# Patient Record
Sex: MALE | Race: WHITE
[De-identification: names, ages, dates, MRNs, and addresses within clinical notes are randomized per-mention and may not be internally consistent; named-entity substitution may affect disease eponyms.]

---

## 2020-11-14 ENCOUNTER — HOSPITAL ENCOUNTER (INPATIENT)
Dept: HOSPITAL 97 - ER | Age: 80
LOS: 2 days | Discharge: HOME | DRG: 287 | End: 2020-11-16
Attending: HOSPITALIST | Admitting: FAMILY MEDICINE
Payer: COMMERCIAL

## 2020-11-14 VITALS — BODY MASS INDEX: 28.5 KG/M2

## 2020-11-14 DIAGNOSIS — E03.9: ICD-10-CM

## 2020-11-14 DIAGNOSIS — I25.2: ICD-10-CM

## 2020-11-14 DIAGNOSIS — Z79.82: ICD-10-CM

## 2020-11-14 DIAGNOSIS — Z79.02: ICD-10-CM

## 2020-11-14 DIAGNOSIS — Z79.890: ICD-10-CM

## 2020-11-14 DIAGNOSIS — I10: ICD-10-CM

## 2020-11-14 DIAGNOSIS — E78.5: ICD-10-CM

## 2020-11-14 DIAGNOSIS — Z95.5: ICD-10-CM

## 2020-11-14 DIAGNOSIS — Z88.5: ICD-10-CM

## 2020-11-14 DIAGNOSIS — Z88.8: ICD-10-CM

## 2020-11-14 DIAGNOSIS — F03.90: ICD-10-CM

## 2020-11-14 DIAGNOSIS — I25.110: Primary | ICD-10-CM

## 2020-11-14 DIAGNOSIS — Z20.828: ICD-10-CM

## 2020-11-14 DIAGNOSIS — F41.8: ICD-10-CM

## 2020-11-14 DIAGNOSIS — Z79.899: ICD-10-CM

## 2020-11-14 LAB
BUN BLD-MCNC: 16 MG/DL (ref 7–18)
CKMB CREATINE KINASE MB: 1.9 NG/ML (ref 0.3–3.6)
GLUCOSE SERPLBLD-MCNC: 110 MG/DL (ref 74–106)
HCT VFR BLD CALC: 41.3 % (ref 39.6–49)
INR BLD: 0.97
LYMPHOCYTES # SPEC AUTO: 1.6 K/UL (ref 0.7–4.9)
NT-PROBNP SERPL-MCNC: 183 PG/ML (ref ?–450)
PMV BLD: 9.1 FL (ref 7.6–11.3)
POTASSIUM SERPL-SCNC: 4.4 MMOL/L (ref 3.5–5.1)
RBC # BLD: 4.32 M/UL (ref 4.33–5.43)
TROPONIN (EMERG DEPT USE ONLY): < 0.02 NG/ML (ref 0–0.04)
TROPONIN I: < 0.02 NG/ML (ref 0–0.04)

## 2020-11-14 PROCEDURE — 82553 CREATINE MB FRACTION: CPT

## 2020-11-14 PROCEDURE — 99285 EMERGENCY DEPT VISIT HI MDM: CPT

## 2020-11-14 PROCEDURE — 36415 COLL VENOUS BLD VENIPUNCTURE: CPT

## 2020-11-14 PROCEDURE — 85610 PROTHROMBIN TIME: CPT

## 2020-11-14 PROCEDURE — 85025 COMPLETE CBC W/AUTO DIFF WBC: CPT

## 2020-11-14 PROCEDURE — 84484 ASSAY OF TROPONIN QUANT: CPT

## 2020-11-14 PROCEDURE — 93005 ELECTROCARDIOGRAM TRACING: CPT

## 2020-11-14 PROCEDURE — 93454 CORONARY ARTERY ANGIO S&I: CPT

## 2020-11-14 PROCEDURE — 82550 ASSAY OF CK (CPK): CPT

## 2020-11-14 PROCEDURE — 83880 ASSAY OF NATRIURETIC PEPTIDE: CPT

## 2020-11-14 PROCEDURE — 80061 LIPID PANEL: CPT

## 2020-11-14 PROCEDURE — 71045 X-RAY EXAM CHEST 1 VIEW: CPT

## 2020-11-14 PROCEDURE — 83735 ASSAY OF MAGNESIUM: CPT

## 2020-11-14 PROCEDURE — 84443 ASSAY THYROID STIM HORMONE: CPT

## 2020-11-14 PROCEDURE — 80048 BASIC METABOLIC PNL TOTAL CA: CPT

## 2020-11-14 PROCEDURE — 84439 ASSAY OF FREE THYROXINE: CPT

## 2020-11-14 RX ADMIN — Medication SCH ML: at 21:37

## 2020-11-14 RX ADMIN — ATORVASTATIN CALCIUM SCH MG: 40 TABLET, FILM COATED ORAL at 21:36

## 2020-11-14 RX ADMIN — DONEPEZIL HYDROCHLORIDE SCH MG: 5 TABLET ORAL at 21:37

## 2020-11-14 NOTE — RAD REPORT
EXAM DESCRIPTION:  Shahriar Single View11/14/2020 12:52 pm

 

CLINICAL HISTORY:  Chest pain

 

COMPARISON:  2016

 

FINDINGS:   The lungs appear clear of acute infiltrate. The heart is normal size

 

IMPRESSION:   No acute abnormalities displayed

## 2020-11-14 NOTE — XMS REPORT
Continuity of Care Document

                          Created on:2020



Patient:CHIQUIS DODD

Sex:Male

:1940

External Reference #:[id number]





Demographics







                          Address                   [address] [place] [address]

 

                          Home Phone                [phone]

 

                          Preferred Language        en-US

 

                          Marital Status            Unknown

 

                          Shinto Affiliation     Unknown

 

                          Race                      Unknown

 

                          Ethnic Group              Unknown









Author







                          Organization              [facility]

 [facility]









Care Team Providers







                    Name                Role                Phone

 

                    [facility] Unavailable         Un

available









Problems







          Problem   Status    Onset     Classification Date      Comments  Sourc

e



                              Date                Reported            



                                                                      



                                                                      



                                                                      

 

          Essential tremor Active    09/15/20  Problem   2020           [facility]

lorena



          (disorder)           15                                      Neuro



                                                                      



                                                                      

 

          Dystonic tremor Active              Problem   2020           [facility]

tracey



          (finding)                                                   Neuro



                                                                      



                                                                      

 

          Hypertensive Active              Problem   2020           [facility]

r



          disorder, systemic                                                   N

euro



          arterial                                                    



          (disorder)                                                   



                                                                      

 

          Hyperlipidemia Active              Problem   2020           [facility]

her



          (disorder)                                                   Neuro



                                                                      



                                                                      

 

          Hypothyroidism Active              Problem   2020           [facility]

her



          (disorder)                                                   Neuro



                                                                      



                                                                      

 

          Impaired cognition Active              Problem   2020           

[facility]



          (finding)                                                   Neuro



                                                                      



                                                                      







Medications







        Medication Details Route   Status  Patient Ordering Order   Source



                                        Instructions Provider Date    



                                                                



                                                                



                                                                

 

        Donepezil 5 mg = 1         Active                   [facility]



        hydrochloride 5 tab, PO,                                 020     [facility]



        MG Oral Tablet Bedtime, #                                         



        [Aricept] 30 tab, 3                                         



                Refill(s)                                         



                                                                



                                                                

 

        primidone 50 mg 25 mg =         Active                   [facility]



        oral tablet 0.5 tab,                                 019     [facility]



                PO, BID, #                                         



                90 tab, 0                                         



                Refill(s),                                         



                Pharmacy:                                         



                [id number]                                         



                                                                



                                                                







Allergies, Adverse Reactions, Alerts







        Substance Category Reaction Severity Reaction Status  Date    Comments S

ource



                                        type            Reported         



                                                                        



                                                                        



                                                                        

 

        iodine  Assertion         Moderate Drug    Active                  [facility]

er



                                        allergy                         [facility]



                                                                        



                                                                        







Immunizations







                                        No Data Provided for This Section







Results







                                        No Data Provided for This Section







Pathology Reports







                                        No Data Provided for This Section







Diagnostic Reports







                                        No Data Provided for This Section







Consultation Notes







                                        No Data Provided for This Section







Discharge Summaries







                                        No Data Provided for This Section







History and Physicals







                                        No Data Provided for This Section







Vital Signs







             Vital Sign   Value        Date         Comments     Source



                                                                 

 

             Systolic (mm Hg) 138          2020                [facility]

ro



                                                                 



                                                                 

 

             Diastolic (mm Hg) 72           2020                [facility]

uro



                                                                 



                                                                 

 

             Heart Rate   52           2020                [facility]



                                                                 



                                                                 

 

             Respitory Rate 16           2020                [facility]



                                                                 



                                                                 

 

             Height       177.8 cm     2020                [facility]



                                                                 



                                                                 

 

             Weight       94.545       2020                [facility]



                                                                 



                                                                 

 

             BMI Calculated 29.91        2020                [facility]



                                                                 



                                                                 

 

             Systolic (mm Hg) 124          2020                [facility]

ro



                                                                 



                                                                 

 

             Diastolic (mm Hg) 69           2020                [facility]

uro



                                                                 



                                                                 

 

             Heart Rate   50           2020                [facility]



                                                                 



                                                                 

 

             Respitory Rate 16           2020                [facility]



                                                                 



                                                                 

 

             Temperature Oral (F) 97.1 F       2020                [facility]

 Neuro



                                                                 



                                                                 

 

             Height       182.88 cm    2020                [facility]



                                                                 



                                                                 

 

             Weight       94.545       2020                [facility]



                                                                 



                                                                 

 

             BMI Calculated 28.27        2020                [facility]



                                                                 



                                                                 

 

             Systolic (mm Hg) 116          2019                [facility]

ro



                                                                 



                                                                 

 

             Diastolic (mm Hg) 64           2019                [facility]

uro



                                                                 



                                                                 

 

             Heart Rate   55           2019                [facility]



                                                                 



                                                                 

 

             Respitory Rate 16           2019                [facility]



                                                                 



                                                                 

 

             Height       177.8 cm     2019                [facility]



                                                                 



                                                                 

 

             Weight       88.636       2019                [facility]



                                                                 



                                                                 

 

             BMI Calculated 28.04        2019                [facility]



                                                                 



                                                                 







Encounters







       Location Location Encounter Encounter Reason Attending ADM    [facility]     [facility]

us Source



              Details Type   Number For    Provider Date   Date          



                                   Visit                              



                                                                      



                                                                      



                                                                      

 

                     Outpatient [id number]        MARY GRACE           Active 

[facility]                  Rj



                                                                      



                                                                      



                                                                      



                                                                      

 

       [facility]           Outside [id number]                        [facility]



       Neurology        Medical                      /2019         Neuro



       [place]        Records                                           



                                                                      



                                                                      



                                                                      

 

                     Outpatient [id number]        Mary Grace           Active 

[facility]                  Rj



                                                                      



                                                                      



                                                                      



                                                                      

 

       [facility]           Outpatient [id number]        Mary Grace           

[facility]



       Neurology                             Krell  /2019         Neuro



       [place]                                                         



                                                                      



                                                                      



                                                                      

 

                     Outpatient [id number]        Mary Grace           Active 

[facility]                  [place]



                                                                      



                                                                      



                                                                      



                                                                      

 

                     Outpatient [id number]        Mary Grace           Active 

[facility]                  [place]



                                                                      



                                                                      



                                                                      



                                                                      

 

       [facility]           Ambulatory [id number]        [place]           

[facility]



       Neurology        Pre-Reg               Feaver /2020         Neuro



       [place]                                                         



                                                                      



                                                                      



                                                                      

 

       [facility]           Outpatient [id number]        [place]           

[facility]



       Neurology                             Feaver /2020         Neuro



       [place]                                                         



                                                                      



                                                                      



                                                                      

 

                     Outpatient [id number]        Mary Grace           Active 

[facility]                  [place]



                                                                      



                                                                      



                                                                      



                                                                      

 

       [facility]           Outpatient [id number]        Mary Grace           

[facility]



       Neurology                             Krell  /2020         Neuro



       [place]                                                         



                                                                      



                                                                      



                                                                      

 

                     Outpatient [id number]        Mary Grace           Active 

[facility]                  [place]



                                                                      



                                                                      



                                                                      



                                                                      







Procedures







                                        No Data Provided for This Section







Assessment and Plan







                                        No Data Provided for This Section







Plan of Care







                                        No Data Provided for This Section







Social History







                    Social History      Date                Source



                                                            

 

                    Social History TypeResponse 2020          [facility]

o



                    Smoking Status                          



                                        Former smoker; Type: Cigarettes; Exposur

e to Tobacco Smoke None; Cigarette 

Smoking Last 365 Days No; Reg Smoking Cessation Counseling No1                  

         



                    entered on: 20                     



                    1Tobacco free approximetley 40yrs ago                     







Family History







                                        No Data Provided for This Section







Advance Directives







                                        No Data Provided for This Section







Functional Status







                                        No Data Provided for This Section

## 2020-11-14 NOTE — EDPHYS
Physician Documentation                                                                           

 [facility]                                                                 

Name: Luan Hogan                                                                                 

Age: 80 yrs                                                                                       

Sex: Male                                                                                         

: 1940                                                                                   

MRN: [id number]                                                                                   

Arrival Date: 2020                                                                          

Time: 11:52                                                                                       

Account#: [id number]                                                                            

Bed 7                                                                                             

Private MD: Ankush Stephenson                                                                         

ED Physician Vikas De Souza                                                                         

HPI:                                                                                              

                                                                                             

14:03 This 80 yrs old  Male presents to ER via Ambulatory with complaints of Chest   rn  

      Pain, Chest Pressure.                                                                       

14:03 The patient or guardian reports chest pain that is located primarily in the substernal  rn  

      area. Onset: this morning. The pain does not radiate. Associated signs and symptoms:        

      Pertinent positives: None. Pertinent negatives: abdominal pain, cough, diaphoresis,         

      dizziness, lower extremity swelling, lightheadedness, nausea, near syncope,                 

      palpitations, recent travel, shortness of breath, syncope, vomiting. The chest pain is      

      described as a heaviness, a pressure. Duration: The patient or guardian reports             

      multiple episodes, that have now resolved. Modifying factors: The symptoms are              

      alleviated by nothing. the symptoms are aggravated by nothing. Severity of pain: At its     

      worst the pain was moderate in the emergency department the pain has resolved. It is        

      unknown whether or not the patient has had similar symptoms in the past. Reports chest      

      pain, 2 episodes, began early this morning, lasts approx 10-15 min, went away on its        

      own, then came back for another 10 min. NO fever/cough/sob/abd pain/hemoptysis. .           

                                                                                                  

Historical:                                                                                       

- Allergies:                                                                                      

12:03 Iodine;                                                                                 iw  

12:03 tramadol;                                                                               iw  

- Home Meds:                                                                                      

12:03 lisinopril 20 mg Oral tab 1 tab once daily [Active]; allopurinol 300 mg Oral tab 1 tab  iw  

      once daily [Active]; clopidogrel 75 mg oral tab 1 tab once daily [Active]; metoprolol       

      tartrate 25 mg Oral tab 1 tab once daily [Active]; escitalopram oxalate 10 mg oral tab      

      1 tab once daily [Active]; levothyroxine 112 mcg tab 1 tab once daily [Active];             

      primidone 50 mg Oral tab [Active]; atorvastatin 40 mg oral tab 1 tab once daily             

      [Active]; aspirin 81 mg Oral TbEC 1 tab once daily [Active]; donepezil 5 mg oral TbDL 1     

      tab once daily [Active];                                                                    

- PMHx:                                                                                           

12:03 Myocardial infarction;                                                                  iw  

- PSHx:                                                                                           

12:03 Heart stents; Hernia repair;                                                            iw  

                                                                                                  

- Immunization history:: Adult Immunizations up to date.                                          

- Social history:: Smoking status: Patient/guardian denies using tobacco, but has a               

  distant history of tobacco abuse.                                                               

- Family history:: not pertinent.                                                                 

- Hospitalizations: : No recent hospitalization is reported.                                      

                                                                                                  

                                                                                                  

ROS:                                                                                              

14:03 Constitutional: Negative for fever, chills, and weight loss, Eyes: Negative for injury, rn  

      pain, redness, and discharge, Neck: Negative for injury, pain, and swelling,                

      Cardiovascular: Negative for palpitations, and edema, Respiratory: Negative for             

      shortness of breath, cough, wheezing, and pleuritic chest pain, Abdomen/GI: Negative        

      for abdominal pain, nausea, vomiting, diarrhea, and constipation, Back: Negative for        

      injury and pain, : Negative for injury, bleeding, discharge, and swelling,                

      MS/Extremity: Negative for injury and deformity, Skin: Negative for injury, rash, and       

      discoloration, Neuro: Negative for headache, weakness, numbness, tingling, and seizure.     

                                                                                                  

Exam:                                                                                             

14:03 Constitutional:  This is a well developed, well nourished patient who is awake, alert,  rn  

      and in no acute distress. Head/Face:  Normocephalic, atraumatic. Cardiovascular:            

      Bradycardic, Regular rhythm.  No pulse deficits. Respiratory:  Speaking full sentences,     

      unlabored.  Abdomen/GI:  soft, non-tender Skin:  Warm, dry MS/ Extremity:  Pulses           

      equal, no cyanosis.  Neurovascular intact.  Full, normal range of motion.  Equal            

      circumference. Neuro:  Awake and alert, GCS 15                                              

                                                                                                  

Vital Signs:                                                                                      

11:58  / 91; Pulse 56; Resp 16; Temp 98.1; Pulse Ox 98% on R/A; Weight 92.99 kg; Height iw  

      5 ft. 11 in. (180.34 cm);                                                                   

12:38  / 77; Pulse 66; Resp 17 S; Pulse Ox 100% on R/A;                                 jd3 

13:38  / 74; Pulse 52; Resp 19 S; Pulse Ox 98% on R/A;                                  jd3 

14:15  / 77; Pulse 62; Resp 13; Pulse Ox 96% ;                                          bp  

16:14  / 70; Pulse 62; Resp 17; Pulse Ox 99% ;                                          rb3 

11:58 Body Mass Index 28.59 (92.99 kg, 180.34 cm)                                             iw  

                                                                                                  

MDM:                                                                                              

12:16 Patient medically screened.                                                             rn  

14:14 Differential diagnosis: abnormal EKG, acute myocardial infarction, coronary artery      rn  

      disease costochondritis, esophagitis, gastritis, gastroesophageal reflux disease            

      (GERD), pleurisy, pneumonia, pneumothorax, stable angina, unstable angina. The patient      

      was given aspirin in the Emergency Department. Data reviewed: vital signs, nurses           

      notes, lab test result(s), EKG, radiologic studies, plain films, and as a result, I         

      will admit patient. Counseling: I had a detailed discussion with the patient and/or         

      guardian regarding: the historical points, exam findings, and any diagnostic results        

      supporting the discharge/admit diagnosis, lab results, radiology results, the need for      

      further work-up and treatment in the hospital. Response to treatment: the patient's         

      symptoms have resolved after treatment, the patient's condition has returned to base        

      line, and as a result, I will admit patient. Admission orders: after a detailed             

      discussion of the patient's condition and case, the admit orders are written by me.         

      Special discussion:. ED course: Consulted with Dr. Hanna, will consult when admitted.     

      Admitted to Dr. Michel. .                                                                   

                                                                                                  

                                                                                             

12:27 Order name: Basic Metabolic Panel; Complete Time: 13:10                                 rn  

                                                                                             

12:27 Order name: CBC with Diff; Complete Time: 13:10                                         rn  

                                                                                             

12:27 Order name: NT PRO-BNP; Complete Time: 13:10                                            rn  

                                                                                             

12:27 Order name: PT-INR; Complete Time: 13:10                                                rn  

                                                                                             

12:27 Order name: Troponin (emerg Dept Use Only); Complete Time: 13:10                        rn  

                                                                                             

12:27 Order name: XRAY Chest (1 view); Complete Time: 13:30                                   rn  

                                                                                             

12:27 Order name: EKG; Complete Time: 12:27                                                   rn  

                                                                                             

12:27 Order name: Cardiac monitoring; Complete Time: 12:37                                    rn  

                                                                                             

12:27 Order name: EKG - Nurse/Tech; Complete Time: 12:38                                      rn  

                                                                                             

12:27 Order name: IV Saline Lock; Complete Time: 12:38                                        rn  

                                                                                             

12:27 Order name: Labs collected and sent; Complete Time: 12:38                               rn  

                                                                                             

12:27 Order name: O2 Per Protocol; Complete Time: 12:38                                       rn  

                                                                                             

12:27 Order name: O2 Sat Monitoring; Complete Time: 12:38                                     rn  

                                                                                                  

Administered Medications:                                                                         

13:55 Drug: Aspirin Chewable Tablet 324 mg Route: PO;                                         jd3 

                                                                                                  

                                                                                                  

Disposition:                                                                                      

20 14:17 Hospitalization ordered by Red Michel for Observation. Preliminary             

  diagnosis is Chest pain, unspecified.                                                           

- Bed requested for Telemetry/MedSurg (observation).                                              

- Status is Observation.                                                                      bp  

- Condition is Stable.                                                                            

- Problem is new.                                                                                 

- Symptoms have improved.                                                                         

                                                                                                  

                                                                                                  

                                                                                                  

Signatures:                                                                                       

Dispatcher [employer]                           EDMS                                                 

Mirna Irene RN RN iw Nieto, Roman, MD MD rn Davies, Jonathon, RN RN jd3 Aguilar, Jose, RN RN ja1 Peltier, Brian RN                      RN   bp                                                   

                                                                                                  

Corrections: (The following items were deleted from the chart)                                    

14:12 14:03 Constitutional: Negative for fever, chills, and weight loss, Eyes: Negative for   rn  

      injury, pain, redness, and discharge, Neck: Negative for injury, pain, and swelling,        

      Cardiovascular: Negative for palpitations, and edema, Respiratory: Negative for             

      shortness of breath, cough, wheezing, and pleuritic chest pain, Abdomen/GI: Negative        

      for abdominal pain, nausea, vomiting, diarrhea, and constipation, Back: Negative for        

      injury and pain, : Negative for injury, bleeding, discharge, and swelling,                

      MS/Extremity: Negative for injury and deformity, Skin: Negative for injury, rash, and       

      discoloration, Neuro: Negative for headache, weakness, numbness, tingling, and seizure,     

      rn                                                                                          

15:36 14:17 Hospitalization Ordered by Red Michel DO for Observation. Preliminary          ja1 

      diagnosis is Chest pain, unspecified. Bed requested for Telemetry/MedSurg                   

      (observation). Status is Observation. Condition is Stable. Problem is new. Symptoms         

      have improved. rn                                                                           

17:38 15:36 2020 14:17 Hospitalization Ordered by Red Michel DO for Observation.     bp  

      Preliminary diagnosis is Chest pain, unspecified. Bed requested for Telemetry/MedSurg       

      (observation). Status is Observation. Condition is Stable. Problem is new. Symptoms         

      have improved. ja1                                                                          

                                                                                                  

**************************************************************************************************

## 2020-11-14 NOTE — XMS REPORT
Summary of Care: 20 - 20

                          Created on:September 15, 2097



Patient:CHIQUIS DODD

Sex:Male

:1940

External Reference #:[id number]





Demographics







                          Address                   [address] [place] [address]

 

                          Home Phone                [phone]

 

                          Email Address             [email or web address]

 

                          Preferred Language        English

 

                          Marital Status            

 

                          Gnosticism Affiliation     Unknown

 

                          Race                      Other

 

                          Additional Race(s)        Unavailable

 

                          Ethnic Group              Not  or 









Author







                          Organization              [facility]

 

                          Address                   [address] [place] [address]-

 

                          Phone                     [phone]









Encounter

[facility](FIN) [id number] Date(s): 20 - 20

[facility] [address] [place] [address]-     [phone]

Discharge Disposition: Home or Self Care

Attending Physician: Luis Sharma MD

Referring Physician: Luis Sharma MD





Vital Signs







                          Most recent to oldest [Reference Range]: 1

 

                          Height                    177.8 cm



                                                    (20 1:32 PM)

 

                          Blood Pressure [/60-90 mmHg] 138/72 mmHg



                                                    (20 1:32 PM)

 

                          Respiratory Rate [14-20 BRMIN] 16 BRMIN



                                                    (20 1:32 PM)

 

                          Peripheral Pulse Rate [ bpm] 52 bpm



                                                    *LOW*



                                                    (20 1:32 PM)

 

                          Weight                    94.545 kg



                                                    (20 1:32 PM)

 

                          Body Mass Index           29.91 m2



                                                    (20 1:32 PM)







Problem List







             Condition    Effective Dates Status       Health Status Informant

 

             Dystonic tremor(Confirmed)              Active                    

 

             Essential tremor(Confirmed) 9/15/15      Active                    

 

             HTN - Hypertension(Confirmed)              Active                  

  

 

             Hyperlipidemia(Confirmed)              Active                    

 

             Hypothyroidism(Confirmed)              Active                    

 

             MCI (mild cognitive              Active                    



             impairment)(Confirmed)                                        







Allergies, Adverse Reactions, Alerts







                Substance       Reaction        Severity        Status

 

                iodine                          Moderate        Active







Medications

Aricept 5 mg oral tablet

5 mg = 1 tab, PO, Bedtime, # 30 tab, 3 Refill(s)

Start Date: 20

Stop Date: 3/5/21

Status: Ordered



Results

No data available for this section



Immunizations

No data available for this section



Procedures

No data available for this section



Social History







                          Social History Type       Response

 

                          Smoking Status            Former smoker; Type: Cigaret

pablo; Exposure to Tobacco Smoke None; 

Cigarette Smoking Last 365 Days No; Reg Smoking Cessation Counseling No1



                                                    entered on: 20



1Tobacco free approximetley 40yrs ago



Assessment and Plan

No data available for this section

## 2020-11-14 NOTE — P.HP
Certification for Inpatient


Patient admitted to: Observation


With expected LOS: <2 Midnights


Patient will require the following post-hospital care: None


Practitioner: I am a practitioner with admitting privileges, knowledge of 

patient current condition, hospital course, and medical plan of care.


Services: Services provided to patient in accordance with Admission requirements

found in Title 42 Section 412.3 of the Code of Federal Regulations





Patient History


Date of Service: 11/14/20


Primary Care Provider: Dr. Stephenson; Cardiology-Dr. Hanna


Reason for admission: Chest pain, shortness of breath


History of Present Illness: 





80-year-old  male with history of CAD, hypertension, hypothyroidism, 

hyperlipidemia and dementia.





Patient presented to the emergency room with chest pain. Chest pain occurred 

twice this morning.  Each lasted several min.  It was mainly to the substernal 

region.  He describes the chest pain more of a pressure-like sensation.  No 

radiation pain noted. He had some shortness of breath.  He came to the ER for 

further evaluation.





In the ER patient evaluated.  No significant ST changes noted. CBC unremarkable.

 Sodium 142, potassium 4.4.  BUN is 16,  GFR 64.  Glucose 110.  Troponin 

unremarkable.  Due to his multiple risk factors the patient was admitted for 

further evaluation and treatment.





When I saw the patient ER, he appeared stable.  Patient denies any tobacco 

history.  Drinks on occasion.


Allergies





iodine Allergy (Unknown, Verified 07/18/16 10:29)


   swelling to throat


tramadol Allergy (Unverified 03/02/18 15:00)


   Unknown





Home medications list reviewed: Yes


Home Medications: 








Amlodipine [Norvasc] 5 mg PO DAILY 01/10/16 


Aspirin Chewable [Aspirin Chewable*] 81 mg PO DAILY 01/10/16 


Atorvastatin Calcium [Lipitor] 40 mg PO DAILY 01/10/16 


Clopidogrel Bisulfate [Plavix] 75 mg PO DAILY 01/10/16 


Folic Acid 1 mg PO DAILY 01/10/16 


Latanoprost Ophth [Xalatan 0.005% Ophth Sol] 25 drops OPTH DAILY 01/10/16 


Levothyroxine [Synthroid] 112 mcg PO [id number] 01/10/16 


Metoprolol Tartrate [Lopressor] 25 mg PO DAILY 01/10/16 


Omega-3 Fatty Acids [Fish Oil] 500 mg PO DAILY 01/10/16 


Primidone [Mysoline] 50 mg PO BID 01/10/16 


Timolol Maleate [Istalol] 1 drop EACH EYE DAILY 01/10/16 


Ubidecarenone [Co Q-10] 100 mg PO DAILY 01/10/16 


allopurinoL [Zyloprim] 300 mg PO DAILY 01/10/16 


lisinopriL [Prinivil] 20 mg PO DAILY 01/10/16 








- Past Medical/Surgical History


Diabetic: No


-: CAD with prior stents


-: HTN


-: Hyperlipidemia


-: Hypothyroidism


-: Dementia


-: Stents


-: Hernia Repair


Psychosocial/ Personal History: Patient is 





- Family History


Family History: Reviewed- Non-Contributory





- Social History


Smoking Status: Never smoker


Alcohol use: Yes


CD- Drugs: No


Caffeine use: Yes


Place of Residence: Home





Review of Systems


General: As per HPI


Eyes: Unremarkable


ENT: Unremarkable


Respiratory: Shortness of Breath, As per HPI


Cardiovascular: Chest Pain, As per HPI


Gastrointestinal: Unremarkable


Genitourinary: Unremarkable


Musculoskeletal: Unremarkable


Integumentary: Unremarkable


Neurological: Unremarkable


Lymphatics: Unremarkable





Physical Examination





- Physical Exam


General: Alert, In no apparent distress, Oriented x3, Cooperative


HEENT: Atraumatic, Normocephalic, Mucous membr. moist/pink


Neck: Supple


Respiratory: Clear to auscultation bilaterally, Normal air movement


Cardiovascular: Normal pulses, Regular rate/rhythm


Gastrointestinal: Normal bowel sounds, Soft and benign, Non-distended, No 

tenderness, No masses, No rebound, No guarding


Musculoskeletal: No erythema, No tenderness, No warmth


Integumentary: No tenderness/swelling, No erythema, No warmth, No cyanosis


Neurological: Normal speech, Normal strength at 5/5 x4 extr, Normal tone, Normal

 affect





- Studies


Laboratory Data (last 24 hrs)





11/14/20 12:34: PT 11.4, INR 0.97


11/14/20 12:34: WBC 5.0, Hgb 13.6, Hct 41.3, Plt Count 188


11/14/20 12:34: Sodium 142, Potassium 4.4, BUN 16, Creatinine 1.10, Glucose 110 

H








Assessment and Plan





- Plan





Impression:


Chest pain with shortness of breath with history of CAD and prior stents


Hypertension


Hypothyroidism


Hyperlipidemia


Dementia


Depression with anxiety





Plan:


Chest pain with shortness of breath with history of CAD and prior stents:  

Patient will be admitted for further evaluation and treatment.  Continue 

telemetry and monitor cardiac enzymes.  Cardiology consulted to further 

evaluate.  Patient has seen Cardiology as an outpatient.  Continue aspirin, 

Plavix, statin and blood pressure medication.  Will provide Lovenox for DVT 

prophylaxis.  Await further recommendations from cardiology.  Anticipate 

improvement and possible discharge tomorrow if workup unremarkable.


Hypertension:  Continue lisinopril.  Will monitor and adjust appropriately.


Hypothyroidism:  Restart home medication.  Will check tsh and free T4.


Hyperlipidemia: Restart home medication.  Check fasting lipid panel.


Dementia: Restart home medication.


Depression with anxiety:  Restart home medication.


Discharge Plan: Home


Plan to discharge in: 24 Hours





- Advance Directives


Does patient have a Living Will: Yes


Does patient have a Durable POA for Healthcare: Yes





- Code Status/Comfort Care


Code Status Assessed: Yes (Patient full code)


Time Spent Managing Pts Care (In Minutes): 55

## 2020-11-14 NOTE — XMS REPORT
Summary of Care: 20 - 20

                            Created on:2089



Patient:CHIQUIS DODD

Sex:Male

:1940

External Reference #:[id number]





Demographics







                          Address                   [address] [id number]

 

                          Home Phone                [phone]

 

                          Email Address             [email or web address]

 

                          Preferred Language        English

 

                          Marital Status            

 

                          Alevism Affiliation     Unknown

 

                          Race                      Other

 

                          Additional Race(s)        Unavailable

 

                          Ethnic Group              Not  or 









Author







                          Organization              [facility] [place]

 

                          Address                   [address] [place] [address]-

 

                          Phone                     [phone]









Encounter

[facility](FIN) [id number] Date(s): 20 - 20

[facility] [address] [place] [address]-     [phone]

Discharge Disposition: Home or Self Care

Attending Physician: Luis Sharma MD

Referring Physician: Ankush Stephenson MD





Vital Signs







                          Most recent to oldest [Reference Range]: 1

 

                          Height                    182.88 cm



                                                    (20 10:25 AM)

 

                          Temperature Oral [96.4-99.1 DegF] 97.1 DegF



                                                    (20 10:25 AM)

 

                          Blood Pressure [/60-90 mmHg] 124/69 mmHg



                                                    (20 10:25 AM)

 

                          Respiratory Rate [14-20 BRMIN] 16 BRMIN



                                                    (20 10:25 AM)

 

                          Peripheral Pulse Rate [ bpm] 50 bpm



                                                    *LOW*



                                                    (20 10:25 AM)

 

                          Weight                    94.545 kg



                                                    (20 10:25 AM)

 

                          Body Mass Index           28.27 m2



                                                    (20 10:25 AM)







Problem List







             Condition    Effective Dates Status       Health Status Informant

 

             Dystonic tremor(Confirmed)              Active                    

 

             Essential tremor(Confirmed) 9/15/15      Active                    

 

             HTN - Hypertension(Confirmed)              Active                  

  

 

             Hyperlipidemia(Confirmed)              Active                    

 

             Hypothyroidism(Confirmed)              Active                    

 

             MCI (mild cognitive              Active                    



             impairment)(Confirmed)                                        







Allergies, Adverse Reactions, Alerts







                Substance       Reaction        Severity        Status

 

                iodine                          Moderate        Active







Medications

No Known Medications



Results

No data available for this section



Immunizations

No data available for this section



Procedures

No data available for this section



Social History







                          Social History Type       Response

 

                          Smoking Status            Former smoker; Type: Cigaret

pablo; Exposure to Tobacco Smoke None; 

Cigarette Smoking Last 365 Days No; Reg Smoking Cessation Counseling No1



                                                    entered on: 20



1Tobacco free approximetley 40yrs ago



Assessment and Plan

No data available for this section

## 2020-11-14 NOTE — ER
Nurse's Notes                                                                                     

 [facility]                                                                 

Name: Luan Hogan                                                                                 

Age: 80 yrs                                                                                       

Sex: Male                                                                                         

: 1940                                                                                   

MRN: [id number]                                                                                   

Arrival Date: 2020                                                                          

Time: 11:52                                                                                       

Account#: [id number]                                                                            

Bed 7                                                                                             

Private MD: Ankush Stephenson                                                                         

Diagnosis: Chest pain, unspecified                                                                

                                                                                                  

Presentation:                                                                                     

                                                                                             

11:58 Chief complaint: Patient states: chest pressure started at 0630 this morning ,          iw  

      intermittent, lasts approx 10 minutes. Coronavirus screen: At this time, the client         

      does not indicate any symptoms associated with coronavirus-19. Ebola Screen: Patient        

      negative for fever greater than or equal to 101.5 degrees Fahrenheit, and additional        

      compatible Ebola Virus Disease symptoms Patient denies exposure to infectious person.       

      Patient denies travel to an Ebola-affected area in the 21 days before illness onset. No     

      symptoms or risks identified at this time. Initial Sepsis Screen: Does the patient meet     

      any 2 criteria? No. Patient's initial sepsis screen is negative. Does the patient have      

      a suspected source of infection? No. Patient's initial sepsis screen is negative. Risk      

      Assessment: Do you want to hurt yourself or someone else? Patient reports no desire to      

      harm self or others. Onset of symptoms was 2020.                               

11:58 Method Of Arrival: Ambulatory                                                           iw  

11:58 Acuity: JUSTO 3                                                                           iw  

                                                                                                  

Historical:                                                                                       

- Allergies:                                                                                      

12:03 Iodine;                                                                                 iw  

12:03 tramadol;                                                                               iw  

- Home Meds:                                                                                      

12:03 lisinopril 20 mg Oral tab 1 tab once daily [Active]; allopurinol 300 mg Oral tab 1 tab  iw  

      once daily [Active]; clopidogrel 75 mg oral tab 1 tab once daily [Active]; metoprolol       

      tartrate 25 mg Oral tab 1 tab once daily [Active]; escitalopram oxalate 10 mg oral tab      

      1 tab once daily [Active]; levothyroxine 112 mcg tab 1 tab once daily [Active];             

      primidone 50 mg Oral tab [Active]; atorvastatin 40 mg oral tab 1 tab once daily             

      [Active]; aspirin 81 mg Oral TbEC 1 tab once daily [Active]; donepezil 5 mg oral TbDL 1     

      tab once daily [Active];                                                                    

- PMHx:                                                                                           

12:03 Myocardial infarction;                                                                  iw  

- PSHx:                                                                                           

12:03 Heart stents; Hernia repair;                                                            iw  

                                                                                                  

- Immunization history:: Adult Immunizations up to date.                                          

- Social history:: Smoking status: Patient/guardian denies using tobacco, but has a               

  distant history of tobacco abuse.                                                               

- Family history:: not pertinent.                                                                 

- Hospitalizations: : No recent hospitalization is reported.                                      

                                                                                                  

                                                                                                  

Screenin:15 Abuse screen: Denies threats or abuse. Nutritional screening: No deficits noted.        jd3 

      Tuberculosis screening: No symptoms or risk factors identified. Fall Risk Ambulatory        

      Aid- None/Bed Rest/Nurse Assist (0 pts). Gait- Normal/Bed Rest/Wheelchair (0 pts)           

      Mental Status- Oriented to own ability (0 pts). Total Haji Fall Scale indicates No         

      Risk (0-24 pts).                                                                            

                                                                                                  

Assessment:                                                                                       

12:14 General: Appears in no apparent distress. uncomfortable, Behavior is calm, cooperative, jd3 

      appropriate for age. Pain: Complains of pain in chest Pain does not radiate. Quality of     

      pain is described as pressure, Pain began suddenly. Neuro: Level of Consciousness is        

      awake, alert, obeys commands, Oriented to person, place, time, situation.                   

      Cardiovascular: Reports chest pain, Capillary refill < 3 seconds Patient's skin is warm     

      and dry. Rhythm is irregular. Respiratory: Airway is patent Respiratory effort is even,     

      unlabored, Respiratory pattern is regular, symmetrical, Denies cough, shortness of          

      breath. GI: No signs and/or symptoms were reported involving the gastrointestinal           

      system. : No signs and/or symptoms were reported regarding the genitourinary system.      

      EENT: No signs and/or symptoms were reported regarding the EENT system. Derm: Skin is       

      intact, Skin is dry, Skin is normal, Skin temperature is warm. Musculoskeletal:             

      Circulation, motion, and sensation intact. Range of motion: intact in all extremities.      

12:38 Reassessment: Patient appears in no apparent distress at this time. No changes from     jd3 

      previously documented assessment. Patient and/or family updated on plan of care and         

      expected duration. Pain level reassessed. Patient is alert, oriented x 3, equal             

      unlabored respirations, skin warm/dry/pink.                                                 

13:38 Reassessment: Patient appears in no apparent distress at this time. No changes from     jd3 

      previously documented assessment. Patient and/or family updated on plan of care and         

      expected duration. Pain level reassessed. Patient is alert, oriented x 3, equal             

      unlabored respirations, skin warm/dry/pink.                                                 

14:15 Reassessment: Patient appears in no apparent distress at this time. Patient and/or      bp  

      family updated on plan of care and expected duration. Pain level reassessed. Patient is     

      alert, oriented x 3, equal unlabored respirations, skin warm/dry/pink. RECD REPORT FROM     

      ÁNGEL CRANDALL. PT TBA FOR CHEST PAIN.                                                         

14:26 Reassessment: DR AGOSTO AT B/S FOR ADMIT.                                               bp  

16:16 Reassessment: Tried to call report, unable to give report at this time because vera Vieira 

      RN is discharging another pt.                                                               

                                                                                                  

Vital Signs:                                                                                      

11:58  / 91; Pulse 56; Resp 16; Temp 98.1; Pulse Ox 98% on R/A; Weight 92.99 kg; Height iw  

      5 ft. 11 in. (180.34 cm);                                                                   

12:38  / 77; Pulse 66; Resp 17 S; Pulse Ox 100% on R/A;                                 jd3 

13:38  / 74; Pulse 52; Resp 19 S; Pulse Ox 98% on R/A;                                  jd3 

14:15  / 77; Pulse 62; Resp 13; Pulse Ox 96% ;                                          bp  

16:14  / 70; Pulse 62; Resp 17; Pulse Ox 99% ;                                          rb3 

11:58 Body Mass Index 28.59 (92.99 kg, 180.34 cm)                                             iw  

                                                                                                  

ED Course:                                                                                        

11:52 Patient arrived in ED.                                                                  ag5 

11:52 Ankush Stephenson MD is Private Physician.                                                 ag5 

12:00 Triage completed.                                                                       iw  

12:04 Donte Bear, RN is Primary Nurse.                                                  jd3 

12:13 Arm band placed on.                                                                     jd3 

12:13 EKG done, by ED staff, reviewed by Vikas De Souza MD. Patient maintains SpO2 saturation    jd3 

      greater than 95% on room air.                                                               

12:16 Vikas De Souza MD is Attending Physician.                                                rn  

12:16 Patient has correct armband on for positive identification. Placed in gown. Bed in low  jd3 

      position. Call light in reach. Side rails up X 1. Adult w/ patient. Cardiac monitor on.     

      Pulse ox on. NIBP on.                                                                       

12:22 ED physician to see patient.                                                            jd3 

12:38 Inserted saline lock: 20 gauge in right antecubital area, using aseptic technique.      jd3 

      Blood collected.                                                                            

12:48 XRAY Chest (1 view) In Process Unspecified.                                             EDMS

13:38 ED physician to see patient.                                                            jd3 

13:55 Warm blanket given. Diet tray given. PO fluids given.                                   jd3 

14:17 Red Agosto DO is Hospitalizing Provider.                                           rn  

14:22 Report given to Ankush CRANDALL.                                                               jd3 

17:37 No provider procedures requiring assistance completed. Patient admitted, IV remains in  bp  

      place.                                                                                      

                                                                                                  

Administered Medications:                                                                         

13:55 Drug: Aspirin Chewable Tablet 324 mg Route: PO;                                         jd3 

                                                                                                  

                                                                                                  

Outcome:                                                                                          

14:17 Decision to Hospitalize by Provider.                                                    rn  

17:15 Admitted to Med/surg accompanied by tech, via wheelchair, with chart, Report called to  bp  

      YOUNG CRANDALL                                                                                    

17:15 Condition: stable                                                                       bp  

17:15 Instructed on the need for admit.                                                           

17:38 Patient left the ED.                                                                    bp  

                                                                                                  

Signatures:                                                                                       

Dispatcher [employer]                           EDMirna Ferrer, RN                     KARLEY   iw                                                   

Vikas De Souza MD MD rn Davies, Jonathon, RN RN   jAnkush Blackburn RN RN bp Gaskin, Ajare                                ag5                                                  

Kati Mclean, RN                     RN   rb3                                                  

                                                                                                  

Corrections: (The following items were deleted from the chart)                                    

12:38 12:14 Pain: Complains of pain in chest Pain does not radiate. Pain began suddenly, jd3  jd3 

14:23 14:00 Reassessment: Patient appears in no apparent distress at this time. Patient       bp  

      and/or family updated on plan of care and expected duration. Pain level reassessed.         

      Patient is alert, oriented x 3, equal unlabored respirations, skin warm/dry/pink. RECD      

      REPORT FROM ÁNGEL CRANDALL. PT TBA FOR CHEST PAIN bp                                           

14:23 14:00  / 77; Pulse 62bpm; Resp 13bpm; Pulse Ox 96%; bp                            bp  

                                                                                                  

**************************************************************************************************

## 2020-11-15 LAB
BUN BLD-MCNC: 17 MG/DL (ref 7–18)
CKMB CREATINE KINASE MB: 1.4 NG/ML (ref 0.3–3.6)
GLUCOSE SERPLBLD-MCNC: 95 MG/DL (ref 74–106)
HDLC SERPL-MCNC: 54 MG/DL (ref 40–60)
LDLC SERPL CALC-MCNC: 27 MG/DL (ref ?–130)
MAGNESIUM SERPL-MCNC: 2.2 MG/DL (ref 1.8–2.4)
POTASSIUM SERPL-SCNC: 4 MMOL/L (ref 3.5–5.1)
TROPONIN I: < 0.02 NG/ML (ref 0–0.04)
TSH SERPL DL<=0.05 MIU/L-ACNC: 2.63 UIU/ML (ref 0.36–3.74)

## 2020-11-15 RX ADMIN — PRIMIDONE SCH MG: 50 TABLET ORAL at 09:10

## 2020-11-15 RX ADMIN — Medication SCH MG: at 09:11

## 2020-11-15 RX ADMIN — CLOPIDOGREL BISULFATE SCH MG: 75 TABLET, FILM COATED ORAL at 09:10

## 2020-11-15 RX ADMIN — DONEPEZIL HYDROCHLORIDE SCH MG: 5 TABLET ORAL at 21:52

## 2020-11-15 RX ADMIN — ACETAMINOPHEN PRN MG: 500 TABLET, FILM COATED ORAL at 21:54

## 2020-11-15 RX ADMIN — Medication SCH ML: at 09:11

## 2020-11-15 RX ADMIN — Medication SCH ML: at 21:53

## 2020-11-15 RX ADMIN — ESCITALOPRAM OXALATE SCH MG: 20 TABLET, FILM COATED ORAL at 09:10

## 2020-11-15 RX ADMIN — CYANOCOBALAMIN TAB 1000 MCG SCH MCG: 1000 TAB at 09:11

## 2020-11-15 RX ADMIN — METOPROLOL TARTRATE SCH MG: 25 TABLET ORAL at 05:37

## 2020-11-15 RX ADMIN — ATORVASTATIN CALCIUM SCH MG: 40 TABLET, FILM COATED ORAL at 21:53

## 2020-11-15 RX ADMIN — LEVOTHYROXINE SODIUM SCH MG: 112 TABLET ORAL at 05:38

## 2020-11-15 RX ADMIN — ASPIRIN SCH MG: 81 TABLET, COATED ORAL at 09:10

## 2020-11-15 RX ADMIN — FOLIC ACID SCH MG: 1 TABLET ORAL at 09:10

## 2020-11-15 RX ADMIN — Medication SCH MG: at 09:09

## 2020-11-15 RX ADMIN — ACETAMINOPHEN PRN MG: 500 TABLET, FILM COATED ORAL at 15:53

## 2020-11-15 NOTE — P.PN
Subjective


Date of Service: 11/15/20


Primary Care Provider: Dr. Stephenson; Cardiology-Dr. Hanna


Chief Complaint: Chest pain, shortness of breath


Subjective: Other (Patient improved.  Chest pain also improved.  Patient with 

dementia.)





Physical Examination





- Vital Signs


Temperature: 97.5 F


Blood Pressure: 131/64


Pulse: 58


Respirations: 16


Pulse Ox (%): 98





- Physical Exam


General: Alert, In no apparent distress, Disheveled


HEENT: Atraumatic


Neck: Supple


Respiratory: Clear to auscultation bilaterally, Normal air movement


Cardiovascular: Normal pulses, Regular rate/rhythm


Gastrointestinal: Normal bowel sounds, Soft and benign, Non-distended, No 

tenderness, No masses, No rebound, No guarding


Musculoskeletal: No erythema, No tenderness, No warmth


Integumentary: No tenderness/swelling, No erythema, No warmth, No cyanosis


Neurological: Normal speech, Normal strength at 5/5 x4 extr, Normal tone, 

Dementia





- Studies


Laboratory Data (last 24 hrs)





11/14/20 12:34: PT 11.4, INR 0.97


11/14/20 12:34: WBC 5.0, Hgb 13.6, Hct 41.3, Plt Count 188


11/14/20 12:34: Sodium 142, Potassium 4.4, BUN 16, Creatinine 1.10, Glucose 110 

H





Medications List Reviewed: Yes





Assessment & Plan


Discharge Plan: Home


Plan to discharge in: 24 Hours


Physician Review Additional Text: 





Impression:


Chest pain with shortness of breath secondary to unstable angina complicated 

with history of CAD and prior stents


Hypertension


Hypothyroidism


Hyperlipidemia


Dementia


Depression with anxiety





Plan:


Chest pain with shortness of breath secondary to unstable angina complicated 

with history of CAD and prior stents:  Case discussed in detail with cardiology.

 Due to his dementia it is difficult to get an accurate history.  Cardiology 

believes chest pain related to unstable angina.  Previous records reviewed.  

Cardiology desires heart catheterization to further evaluate and assess 

especially with his significant CAD history and prior stents.  Continue to 

monitor closely.  Continue aspirin, Plavix, statin medication and blood pressure

medication control.  Will keep the patient NPO after midnight in preparation for

heart catheterization tomorrow.  Will discuss with family.  Continue DVT 

prophylaxis-Lovenox.  I will turn the service over to the hospitalist team 

tomorrow.  I will go over the plan of care with him. 


Hypertension:  Continue with medication.  Will monitor and adjust appropriately.


Hypothyroidism:  Continue medication.  Tsh within normal range.


Hyperlipidemia:  Continue medication.  LDL 27.


Dementia: Continue medication. 


Depression with anxiety:  Continue medication


Time Spent Managing Pts Care (In Minutes): 55

## 2020-11-15 NOTE — EKG
Test Date:    2020-11-14               Test Time:    12:09:42

Technician:   AGUSTIN                                    

                                                     

MEASUREMENT RESULTS:                                       

Intervals:                                           

Rate:         54                                     

NV:           198                                    

QRSD:         96                                     

QT:           422                                    

QTc:          400                                    

Axis:                                                

P:            35                                     

NV:           198                                    

QRS:          -18                                    

T:            21                                     

                                                     

INTERPRETIVE STATEMENTS:                                       

                                                     

Sinus bradycardia with premature atrial complexes

Cannot rule out Anterior infarct, age undetermined

Abnormal ECG

Compared to ECG 06/10/2016 16:47:01

Atrial premature complex(es) now present

Myocardial infarct finding now present



Electronically Signed On 11-15-20 07:40:39 CST by Braxton Hanna

## 2020-11-16 VITALS — OXYGEN SATURATION: 98 %

## 2020-11-16 VITALS — SYSTOLIC BLOOD PRESSURE: 126 MMHG | TEMPERATURE: 97.3 F | DIASTOLIC BLOOD PRESSURE: 58 MMHG

## 2020-11-16 PROCEDURE — B2111ZZ FLUOROSCOPY OF MULTIPLE CORONARY ARTERIES USING LOW OSMOLAR CONTRAST: ICD-10-PCS

## 2020-11-16 PROCEDURE — 4A023N7 MEASUREMENT OF CARDIAC SAMPLING AND PRESSURE, LEFT HEART, PERCUTANEOUS APPROACH: ICD-10-PCS

## 2020-11-16 RX ADMIN — CLOPIDOGREL BISULFATE SCH MG: 75 TABLET, FILM COATED ORAL at 05:42

## 2020-11-16 RX ADMIN — METOPROLOL TARTRATE SCH MG: 25 TABLET ORAL at 05:45

## 2020-11-16 RX ADMIN — ESCITALOPRAM OXALATE SCH MG: 20 TABLET, FILM COATED ORAL at 08:07

## 2020-11-16 RX ADMIN — ASPIRIN SCH: 81 TABLET, COATED ORAL at 08:06

## 2020-11-16 RX ADMIN — LEVOTHYROXINE SODIUM SCH MG: 112 TABLET ORAL at 05:42

## 2020-11-16 RX ADMIN — Medication SCH MG: at 08:07

## 2020-11-16 RX ADMIN — Medication SCH ML: at 08:08

## 2020-11-16 RX ADMIN — PRIMIDONE SCH MG: 50 TABLET ORAL at 08:07

## 2020-11-16 RX ADMIN — FOLIC ACID SCH MG: 1 TABLET ORAL at 08:07

## 2020-11-16 RX ADMIN — CYANOCOBALAMIN TAB 1000 MCG SCH MCG: 1000 TAB at 08:07

## 2020-11-16 NOTE — PN
Date of Progress Note:  11/16/2020



Mr. Hogan has came in with stable anginal symptoms.  He has a history of LAD stent in 2014.  He has 
hypertension, dyslipidemia, and hypothyroidism.  His blood pressure and cholesterol are well controll
ed.  He is on appropriate therapy with aspirin, Lipitor, Plavix, and metoprolol.  He is also on Loven
ox.  Prednisone 50 mg were ordered x2 the night before in the morning after catheterization, which wa
s planned for tomorrow, 11/16/2020.  The patient has not had any further chest pain since he was seen
.  Troponin remained negative.





NB/MODL

DD:  11/16/2020 05:57:01Voice ID:  [id number]

DT:  11/16/2020 09:15:29Report ID:  [id number]

## 2020-11-16 NOTE — P.DS
Discharge Date: 11/16/20


Primary Care Provider: Dr. Stephenson; Cardiology-Dr. Hanna


Disposition: ROUTINE DISCHARGE


Discharge Condition: GOOD


Reason for Admission: Chest pain, shortness of breath


Brief History of Present Illness: 








Patient is an 80-year-old  male with history of CAD, hypertension, 

hypothyroidism, hyperlipidemia and dementia.





Patient presented to the emergency room with chest pain. Chest pain occurred 

twice this morning.  Each lasted several min.  It was mainly to the substernal 

region.  He describes the chest pain more of a pressure-like sensation.  No 

radiation pain noted. He had some shortness of breath.  He came to the ER for 

further evaluation.





In the ER patient evaluated.  No significant ST changes noted. CBC unremarkable.

 Sodium 142, potassium 4.4.  BUN is 16,  GFR 64.  Glucose 110.  Troponin 

unremarkable.  Due to his multiple risk factors the patient was admitted for 

further evaluation and treatment.





When I saw the patient ER, he appeared stable.  Patient denies any tobacco 

history.  Drinks on occasion.


Hospital Course: 


She had a cardiac catheterization which revealed multiple vessel disease. There 

was no significant obstructive lesion and needed intervention immediately. 

Clinically, patient is doing better and is stable for discharge home with 

outpatient follow up. Patient is to return to the emergency room if symptoms 

worsen. Otherwise follow with cardiology in 2 weeks. Follow up with PCP in 1-2 

weeks. Continue with antiplatelet therapy and statin therapy. Continue with 

strict blood pressure control. 


Vital Signs/Physical Exam: 














Temp Pulse Resp BP Pulse Ox


 


 97.3 F   61   16   126/58 L  95 


 


 11/16/20 17:21  11/16/20 17:21  11/16/20 17:21  11/16/20 17:21  11/16/20 17:21








General: Alert, In no apparent distress, Oriented x3


Laboratory Data at Discharge: 














WBC  5.0 K/uL (4.3-10.9)   11/14/20  12:34    


 


Hgb  13.6 g/dL (13.6-17.9)   11/14/20  12:34    


 


Hct  41.3 % (39.6-49.0)   11/14/20  12:34    


 


Plt Count  188 K/uL (152-406)   11/14/20  12:34    


 


PT  11.4 SECONDS (9.5-12.5)   11/14/20  12:34    


 


INR  0.97   11/14/20  12:34    


 


Sodium  143 mmol/L (136-145)   11/15/20  05:19    


 


Potassium  4.0 mmol/L (3.5-5.1)   11/15/20  05:19    


 


BUN  17 mg/dL (7-18)   11/15/20  05:19    


 


Creatinine  1.07 mg/dL (0.55-1.3)   11/15/20  05:19    


 


Glucose  95 mg/dL ()   11/15/20  05:19    


 


Magnesium  2.2 mg/dL (1.8-2.4)   11/15/20  05:19    


 


Troponin I  < 0.02 ng/mL (0.0-0.045)   11/15/20  03:36    


 


Triglycerides  65 mg/dL (<150)   11/15/20  05:19    


 


Cholesterol  94 mg/dL (<200)   11/15/20  05:19    


 


HDL Cholesterol  54 mg/dL (40-60)   11/15/20  05:19    


 


Cholesterol/HDL Ratio  1.74   11/15/20  05:19    








Home Medications: 








Aspirin Chewable [Aspirin Chewable*] 81 mg PO DAILY 01/10/16 


Atorvastatin Calcium [Lipitor] 40 mg PO DAILY 01/10/16 


Clopidogrel Bisulfate [Plavix*] 75 mg PO DAILY 01/10/16 


Folic Acid 800 mcg PO DAILY 01/10/16 


Latanoprost Ophth [Xalatan 0.005%*] 1 drops OPTH DAILY 01/10/16 


Levothyroxine [Synthroid*] 112 mcg PO [id number] 01/10/16 


Metoprolol Tartrate [Lopressor*] 25 mg PO DAILY 01/10/16 


Omega-3 Fatty Acids [Fish Oil] 500 mg PO DAILY 01/10/16 


Primidone [Mysoline *] 50 mg PO BID 01/10/16 


Timolol Maleate [Istalol] 1 drop EACH EYE DAILY 01/10/16 


Ubidecarenone [Co Q-10] 400 mg PO DAILY 01/10/16 


allopurinoL [Zyloprim*] 300 mg PO DAILY 01/10/16 


lisinopriL [Prinivil*] 20 mg PO DAILY 01/10/16 


Ascorbic Acid [Vitamin C] 1,000 mg PO DAILY 11/14/20 


Cholecalciferol (Vitamin D3) [Vitamin D3] 5,000 iu PO DAILY 11/14/20 


Cyanocobalamin (Vitamin B-12) [Vitamin B-12] 1,000 mcg PO DAILY 11/14/20 


Donepezil [Aricept*] 5 mg PO DAILY 11/14/20 


Escitalopram [Lexapro*] 10 mg PO DAILY 11/14/20 


Vitamin E Acid Succinate [Vitamin E] 180 mg PO DAILY 11/14/20 


Nitroglycerin [Nitrostat] 0.4 mg SL Q5M #100 tab 11/19/20 


Nitroglycerin [Nitrostat] 0.4 mg SL Q5M #100 tab 11/19/20 





New Medications: 


Nitroglycerin [Nitrostat] 0.4 mg SL Q5M #100 tab


Nitroglycerin [Nitrostat] 0.4 mg SL Q5M #100 tab


Patient Discharge Instructions: OK TO DC IV AND DC HOME.  FOLLOW-UP WITH PRIMARY

 CARE PROVIDER IN 1-2 WEEKS.  FOLLOW-UP WITH CARDIOLOGY IN 1-2 WEEKS.  RETURN TO

 THE ER IF symptoms worsen.  CALL or TEXT DR. CID [phone] IF ANY 

QUESTIONS REGARDING HOSPITAL STAY.  PLEASE CALL THE FLOOR -[phone] IF ANY

 MEDICATION OR NURSING QUESTIONS.


Diet: AHA


Activity: Fall precautions


Followup: 


Braxton Hanna MD [ACTIVE - CAN ADMIT] - 


Ankush Stephenson MD [Primary Care Provider] - 


Time spent managing pt's care (in minutes): 35

## 2020-11-16 NOTE — CON
Date of Consultation:  11/14/2020



Reason For Consultation:  Chest pain.



History Of Present Illness:  Mr. Hogan is an 80-year-old male who had a history of stent in 2014 in 
the LAD.  In 2016, catheterization showed patent stent by Dr. Hurley.  Has not showed up for followup
 while in the office.  Has a history of hypertension, dyslipidemia, hypothyroidism.  He is allergic t
o iodine.  The patient came in with substernal chest pressure radiating to both arms with exertion wi
th some diaphoresis and shortness of breath.  No nausea or vomiting.  Denied PND, orthopnea, pedal ed
ema, palpitations, or syncope.  MI has been ruled out.



Allergies:  IODINE.



Review of Systems:

Negative.



Social History:  Negative.



Family History:  Negative.



Medications:  At home include aspirin, metoprolol, Norvasc, Synthroid, Lipitor, lisinopril, Plavix, z
yloprim.



Physical Examination:

Vital Signs:  Stable.  Afebrile. 

HEENT:  Negative. 

Neck:  Supple.  No bruit. 

Chest:  Clear. 

Cardiac:  Exam revealed aortic sclerosis, murmur.  No gallops or rubs. 

Abdomen:  Benign. 

Extremities:  Revealed no clubbing, cyanosis, or edema.  

Skin:  Dry and intact.  Pulses are present distally bilaterally.



Diagnostic Data:  All within normal limits.



Impression And Plan:  Patient with history of LAD stent x2 in 2014 with symptoms suggestive of unstab
le angina.  We will plan a left heart catheterization to define his coronary anatomy.  The patient un
derstands the risk and the benefits of the procedure and he agreed to proceed.  He will receive 2 dos
e with p.o. prednisone 1 at night and 1 in the morning of the catheterization.  We will hold his Love
nox before the catheterization.  Meanwhile, continue his present regimen, which does include aspirin,
 metoprolol and Lipitor as well as Plavix.  His other problems including hypertension and dyslipidemi
a are very well controlled.  We will see what the catheterization shows before making final decisions
.





NB/GERRI

DD:  11/16/2020 05:54:59Voice ID:  [id number]

DT:  11/16/2020 09:37:51Report ID:  [id number]

## 2020-11-17 NOTE — OP
Date of Procedure:  11/16/2020



Surgeon:  MYRTLE DIAZ



Procedure Performed:  Selective coronary angiogram.



Indication:  Unstable angina.



Access:  Right radial artery 6-[place] closed with TR band.



Complications:  None.



Bleeding:  Less than 5 mL.



Anesthesia:  Total sedation time was 15 minutes.



Description Of Procedure:  After risks, benefits, and alternatives were explained, the patient agreed
 to the procedure and signed informed consent.  The patient was brought into the cardiac catheterizat
ion laboratory and we prepped and draped in usual sterile fashion and used fentanyl, Versed, and also
 prepped with Solu-Medrol and Benadryl for iodine allergy and then achieved adequate moderate sedatio
n.  Then, we used a pediatric micropuncture kit to access right radial artery and placed a 6-[place] s
lender sheath into a 5-[place] Tiger catheter into the aortic root, engaged left main and right corona
ry artery and took standard views and removed the catheter and the wire and the sheath and placed TR 
band with good hemostasis.



Findings:  

1.Left main is large and normal.

2.LAD with a proximal 20% to 30% disease, nonobstructive and in the midportion has a stent that is p
atent and distally has mild, 10% to 20% disease.

3.Left circumflex is a relatively large vessel with a mid 30% stenosis.

4.RCA is dominant circulation with diffuse 10% to 20% stenosis.



Impression:  Mild nonobstructive coronary artery disease.



Recommendation:  Medical management.





SR/MODL

DD:  11/16/2020 13:43:33Voice ID:  [id number]

DT:  11/17/2020 01:07:57Report ID:  [id number]

## 2021-11-24 ENCOUNTER — HOSPITAL ENCOUNTER (EMERGENCY)
Dept: HOSPITAL 97 - ER | Age: 81
Discharge: HOME | End: 2021-11-24
Payer: COMMERCIAL

## 2021-11-24 VITALS — TEMPERATURE: 97.8 F

## 2021-11-24 VITALS — SYSTOLIC BLOOD PRESSURE: 136 MMHG | OXYGEN SATURATION: 100 % | DIASTOLIC BLOOD PRESSURE: 77 MMHG

## 2021-11-24 DIAGNOSIS — Z88.5: ICD-10-CM

## 2021-11-24 DIAGNOSIS — Z87.442: ICD-10-CM

## 2021-11-24 DIAGNOSIS — I25.2: ICD-10-CM

## 2021-11-24 DIAGNOSIS — R31.9: Primary | ICD-10-CM

## 2021-11-24 DIAGNOSIS — Z91.048: ICD-10-CM

## 2021-11-24 DIAGNOSIS — Z79.82: ICD-10-CM

## 2021-11-24 LAB
ALBUMIN SERPL BCP-MCNC: 3.9 G/DL (ref 3.4–5)
ALP SERPL-CCNC: 87 U/L (ref 45–117)
ALT SERPL W P-5'-P-CCNC: 19 U/L (ref 12–78)
AST SERPL W P-5'-P-CCNC: 16 U/L (ref 15–37)
BUN BLD-MCNC: 20 MG/DL (ref 7–18)
GLUCOSE SERPLBLD-MCNC: 92 MG/DL (ref 74–106)
HCT VFR BLD CALC: 45.6 % (ref 39.6–49)
LIPASE SERPL-CCNC: 145 U/L (ref 73–393)
LYMPHOCYTES # SPEC AUTO: 2.1 K/UL (ref 0.7–4.9)
PMV BLD: 8.9 FL (ref 7.6–11.3)
POTASSIUM SERPL-SCNC: 4.5 MMOL/L (ref 3.5–5.1)
RBC # BLD: 4.77 M/UL (ref 4.33–5.43)

## 2021-11-24 PROCEDURE — 99284 EMERGENCY DEPT VISIT MOD MDM: CPT

## 2021-11-24 PROCEDURE — 36415 COLL VENOUS BLD VENIPUNCTURE: CPT

## 2021-11-24 PROCEDURE — 74176 CT ABD & PELVIS W/O CONTRAST: CPT

## 2021-11-24 PROCEDURE — 80076 HEPATIC FUNCTION PANEL: CPT

## 2021-11-24 PROCEDURE — 81015 MICROSCOPIC EXAM OF URINE: CPT

## 2021-11-24 PROCEDURE — 81003 URINALYSIS AUTO W/O SCOPE: CPT

## 2021-11-24 PROCEDURE — 85025 COMPLETE CBC W/AUTO DIFF WBC: CPT

## 2021-11-24 PROCEDURE — 80048 BASIC METABOLIC PNL TOTAL CA: CPT

## 2021-11-24 PROCEDURE — 76377 3D RENDER W/INTRP POSTPROCES: CPT

## 2021-11-24 PROCEDURE — 87088 URINE BACTERIA CULTURE: CPT

## 2021-11-24 PROCEDURE — 83690 ASSAY OF LIPASE: CPT

## 2021-11-24 PROCEDURE — 87086 URINE CULTURE/COLONY COUNT: CPT

## 2021-11-24 NOTE — EDPHYS
Physician Documentation                                                                           

 [facility]                                                                 

Name: Luan Hogan                                                                                 

Age: 81 yrs                                                                                       

Sex: Male                                                                                         

: 1940                                                                                   

MRN: [id number]                                                                                   

Arrival Date: 2021                                                                          

Time: 13:07                                                                                       

Account#: [id number]                                                                            

Bed 5                                                                                             

Private MD: ZEKE Tam C                                                                            

ED Physician Vikas De Souza                                                                         

HPI:                                                                                              

                                                                                             

13:32 This 81 yrs old Male presents to ER via Ambulatory with complaints of Urinary Problem.  jmm 

13:32 Onset: The symptoms/episode began/occurred gradually, 3 week(s) ago. Modifying factors: jmm 

      The symptoms are alleviated by nothing, the symptoms are aggravated by nothing.             

      Associated signs and symptoms: Pertinent positives: hematuria. This is an 81-year-old       

      male with history of MI that presents the emergency department with complaints of blood     

      in his urine for approximately 3 weeks with a mild dull ache on the right lower             

      quadrant of his abdomen extending into the suprapubic region. Patient denies fever,         

      vomiting, diarrhea. Patient states he has had kidney stones in the past..                   

                                                                                                  

Historical:                                                                                       

- Allergies:                                                                                      

13:36 Iodine;                                                                                 ld1 

13:36 tramadol;                                                                               ld1 

- Home Meds:                                                                                      

13:36 allopurinol 300 mg Oral tab 1 tab once daily [Active]; aspirin 81 mg Oral TbEC 1 tab    ld1 

      once daily [Active]; atorvastatin 40 mg Oral tab 1 tab once daily [Active]; clopidogrel     

      75 mg Oral tab 1 tab once daily [Active]; donepezil 5 mg Oral TbDL 1 tab once daily         

      [Active]; escitalopram oxalate 10 mg Oral tab 1 tab once daily [Active]; levothyroxine      

      112 mcg tab 1 tab once daily [Active]; lisinopril 20 mg Oral tab 1 tab once daily           

      [Active]; metoprolol tartrate 25 mg Oral tab 1 tab once daily [Active]; primidone 50 mg     

      Oral tab [Active]; MemorAll oral [Active];                                                  

- PMHx:                                                                                           

13:36 Myocardial infarction;                                                                  ld1 

                                                                                                  

- Immunization history:: Adult Immunizations up to date, Client reports receiving the             

  2nd dose of the Covid vaccine.                                                                  

- Social history:: Smoking status: Patient denies any tobacco usage or history of.                

  Patient uses alcohol, on a daily basis.                                                         

                                                                                                  

                                                                                                  

ROS:                                                                                              

13:32 Constitutional: Negative for fever, chills, and weight loss, Cardiovascular: Negative   jmm 

      for chest pain, palpitations, and edema, Respiratory: Negative for shortness of breath,     

      cough, wheezing, and pleuritic chest pain.                                                  

13:32 Abdomen/GI: Positive for abdominal pain.                                                    

13:32 : Positive for hematuria.                                                                 

13:32 All other systems are negative.                                                             

                                                                                                  

Exam:                                                                                             

13:32 Constitutional:  This is a well developed, well nourished patient who is awake, alert,  jmm 

      and in no acute distress. Head/Face:  atraumatic. Eyes:  EOMI, no conjunctival erythema     

      appreciated ENT:  Moist Mucus Membranes Neck:  Trachea midline, Supple Chest/axilla:        

      Normal chest wall appearance and motion.   Cardiovascular:  Regular rate and rhythm.        

      No edema appreciated Respiratory:  Normal respirations, no respiratory distress             

      appreciated Abdomen/GI:  Non distended, soft Back:  Normal ROM Skin:  General               

      appearance color normal MS/ Extremity:  Moves all extremities, no obvious deformities       

      appreciated, no edema noted to the lower extremities  Neuro:  Awake and alert, normal       

      gait Psych:  Behavior is normal, Mood is normal, Patient is cooperative and pleasant        

                                                                                                  

Vital Signs:                                                                                      

13:34  / 76; Pulse 65; Resp 18; Temp 97.8(O); Pulse Ox 98% on R/A; Weight 93.89 kg;     ld1 

      Height 5 ft. 11 in. (180.34 cm); Pain 0/10;                                                 

15:33  / 77; Pulse 66; Resp 17; Temp 97.8(O); Pulse Ox 100% ; Pain 0/10;                jh6 

13:34 Body Mass Index 28.87 (93.89 kg, 180.34 cm)                                             ld1 

                                                                                                  

MDM:                                                                                              

15:12 Patient medically screened.                                                             [facility] 

15:55 Data reviewed: vital signs, nurses notes. Counseling: I had a detailed discussion with  [facility] 

      the patient and/or guardian regarding: the historical points, exam findings, and any        

      diagnostic results supporting the discharge/admit diagnosis, lab results, radiology         

      results, the need for outpatient follow up, to return to the emergency department if        

      symptoms worsen or persist or if there are any questions or concerns that arise at          

      home. ED course: Patient patient is alert and nontoxic in appearance in the ED. CT is       

      negative for an acute finding that would explain the patient's pain. Labs unremarkable      

      except for UA which reveals RBCs. Patient advised to follow-up with urology for further     

      evaluation for the possibility of cancer. Patient otherwise given strict return             

      precautions for weakness, vomiting or worsening pain, etc. Patient understood and           

      agrees plan of care.                                                                        

                                                                                                  

                                                                                             

13:32 Order name: Urine Culture                                                               [facility] 

                                                                                             

13:32 Order name: Urine Microscopic Only; Complete Time: 15:12                                [facility] 

                                                                                             

14:02 Order name: Urine Dipstick-Ancillary; Complete Time: 14:33                              [facility]

                                                                                             

14:34 Order name: Basic Metabolic Panel; Complete Time: 15:36                                 [facility] 

                                                                                             

14:34 Order name: CBC with Diff; Complete Time: 15:12                                         [facility] 

                                                                                             

14:34 Order name: Hepatic Function; Complete Time: 15:36                                      [facility] 

                                                                                             

13:32 Order name: Urine Dipstick-Ancillary (obtain specimen); Complete Time: 14:01            [facility] 

                                                                                             

14:34 Order name: Lipase; Complete Time: 15:36                                                [facility] 

                                                                                             

14:34 Order name: IV Saline Lock; Complete Time: 14:54                                        [facility] 

                                                                                             

14:34 Order name: Labs collected and sent; Complete Time: 14:54                               [facility] 

                                                                                             

14:34 Order name: CT Stone Protocol; Complete Time: 15:12                                     [facility] 

                                                                                                  

Administered Medications:                                                                         

No medications were administered                                                                  

                                                                                                  

                                                                                                  

Disposition:                                                                                      

17:35 Co-signature as Attending Physician, Vikas De Souza MD I agree with the assessment and     rn  

      plan of care. Attestation: The patient's history, exam findings, diagnostics, and a         

      summary of any interventions or procedures was reviewed in detail with Julian ORTIZ.     

                                                                                                  

Disposition Summary:                                                                              

21 15:57                                                                                    

Discharge Ordered                                                                                 

      Location: Home                                                                          [facility] 

      Condition: Stable                                                                       [facility] 

      Diagnosis                                                                                   

        - Hematuria, unspecified                                                              m 

      Followup:                                                                               [facility] 

        - With: Shola Harris MD                                                                

        - When: 2 - 3 days                                                                         

        - Reason: Recheck today's complaints, Continuance of care, Re-evaluation by your           

      physician                                                                                   

      Discharge Instructions:                                                                     

        - Discharge Summary Sheet                                                             jm 

        - Hematuria, Adult                                                                    jmm 

      Forms:                                                                                      

        - Medication Reconciliation Form                                                      [facility] 

        - Thank You Letter                                                                    [facility] 

        - Antibiotic Education                                                                m 

        - Prescription Opioid Use                                                             [facility] 

Signatures:                                                                                       

Dispatcher [employer]                           Julian Lerma PA PA jmm Nieto, Roman, MD MD   rn                                                   

Pat Echols RN                     RN   ld1                                                  

                                                                                                  

**************************************************************************************************

## 2021-11-24 NOTE — ER
Nurse's Notes                                                                                     

 [facility]                                                                 

Name: Luan Hogan                                                                                 

Age: 81 yrs                                                                                       

Sex: Male                                                                                         

: 1940                                                                                   

MRN: [id number]                                                                                   

Arrival Date: 2021                                                                          

Time: 13:07                                                                                       

Account#: [id number]                                                                            

Bed 5                                                                                             

Private MD: ZEKE Tam C                                                                            

Diagnosis: Hematuria, unspecified                                                                 

                                                                                                  

Presentation:                                                                                     

                                                                                             

13:34 Chief complaint: Patient states: I have right flank pain and I am passing bright red    ld1 

      blood in my urine. Coronavirus screen: At this time, the client does not indicate any       

      symptoms associated with coronavirus-19. Ebola Screen: No symptoms or risks identified      

      at this time. Initial Sepsis Screen: Does the patient meet any 2 criteria? No.              

      Patient's initial sepsis screen is negative. Does the patient have a suspected source       

      of infection? No. Patient's initial sepsis screen is negative. Risk Assessment: Do you      

      want to hurt yourself or someone else? Patient reports no desire to harm self or            

      others. Onset of symptoms was 2021.                                            

13:34 Method Of Arrival: Ambulatory                                                           ld1 

14:30 Acuity: JUSTO 3                                                                           tw2 

                                                                                                  

Triage Assessment:                                                                                

13:36 General: Appears in no apparent distress. comfortable, Behavior is calm, cooperative,   ld1 

      appropriate for age. Pain: Denies pain. EENT: No signs and/or symptoms were reported        

      regarding the EENT system. Neuro: Level of Consciousness is awake, alert, obeys             

      commands, Oriented to person, place, time. Cardiovascular: Capillary refill < 3 seconds     

      Patient's skin is warm and dry. Respiratory: Airway is patent Respiratory effort is         

      even, unlabored, Respiratory pattern is regular, symmetrical. GI: Abdomen is flat,          

      non-distended. : Urine is jennifer blood, Reports urgency, urinary frequency, blood in       

      urine. Derm: No signs and/or symptoms reported regarding the dermatologic system.           

      Musculoskeletal: Reports pain in right low back.                                            

                                                                                                  

Historical:                                                                                       

- Allergies:                                                                                      

13:36 Iodine;                                                                                 ld1 

13:36 tramadol;                                                                               ld1 

- Home Meds:                                                                                      

13:36 allopurinol 300 mg Oral tab 1 tab once daily [Active]; aspirin 81 mg Oral TbEC 1 tab    ld1 

      once daily [Active]; atorvastatin 40 mg Oral tab 1 tab once daily [Active]; clopidogrel     

      75 mg Oral tab 1 tab once daily [Active]; donepezil 5 mg Oral TbDL 1 tab once daily         

      [Active]; escitalopram oxalate 10 mg Oral tab 1 tab once daily [Active]; levothyroxine      

      112 mcg tab 1 tab once daily [Active]; lisinopril 20 mg Oral tab 1 tab once daily           

      [Active]; metoprolol tartrate 25 mg Oral tab 1 tab once daily [Active]; primidone 50 mg     

      Oral tab [Active]; MemorAll oral [Active];                                                  

- PMHx:                                                                                           

13:36 Myocardial infarction;                                                                  ld1 

                                                                                                  

- Immunization history:: Adult Immunizations up to date, Client reports receiving the             

  2nd dose of the Covid vaccine.                                                                  

- Social history:: Smoking status: Patient denies any tobacco usage or history of.                

  Patient uses alcohol, on a daily basis.                                                         

                                                                                                  

                                                                                                  

Screenin:59 Abuse screen: Denies threats or abuse. Nutritional screening: No deficits noted.        tw2 

      Tuberculosis screening: No symptoms or risk factors identified. Fall Risk None              

      identified.                                                                                 

                                                                                                  

Assessment:                                                                                       

14:25 General: Appears in no apparent distress. well groomed, Behavior is calm, cooperative,  tw2 

      appropriate for age. Pain: Complains of pain in right low back. Neuro: Level of             

      Consciousness is awake, alert, obeys commands, Oriented to person, place, time,             

      situation. Cardiovascular: Patient's skin is warm and dry. Respiratory: Airway is           

      patent Respiratory effort is even, unlabored, Respiratory pattern is regular,               

      symmetrical. GI:. : Reports bright red blood in urine. : Reports right flank pain.      

      Musculoskeletal: Range of motion: intact in all extremities.                                

15:33 Reassessment: No changes from previously documented assessment. Patient and/or family   jh6 

      updated on plan of care and expected duration. Pain level reassessed. Patient denies        

      pain at this time.                                                                          

                                                                                                  

Vital Signs:                                                                                      

13:34  / 76; Pulse 65; Resp 18; Temp 97.8(O); Pulse Ox 98% on R/A; Weight 93.89 kg;     ld1 

      Height 5 ft. 11 in. (180.34 cm); Pain 0/10;                                                 

15:33  / 77; Pulse 66; Resp 17; Temp 97.8(O); Pulse Ox 100% ; Pain 0/10;                jh6 

13:34 Body Mass Index 28.87 (93.89 kg, 180.34 cm)                                             ld1 

                                                                                                  

ED Course:                                                                                        

13:07 Patient arrived in ED.                                                                  mr  

13:07 ZEKE Tam MD is Private Physician.                                                       mr  

13:36 Arm band placed on right wrist.                                                         ld1 

14:01 Urine Culture Sent.                                                                     ld1 

14:01 Urine Microscopic Only Sent.                                                            ld1 

14:30 Bed in low position. Call light in reach. Adult w/ patient. Pulse ox on. NIBP on.       tw2 

14:31 Urine Culture Sent.                                                                     ld1 

14:31 Urine Microscopic Only Sent.                                                            ld1 

14:33 Julian Murcia PA is PHCP.                                                              [facility] 

14:33 Vikas De Souza MD is Attending Physician.                                                jmm 

14:41 Shirley Andujar, RN is Primary Nurse.                                                        tw2 

14:51 Inserted saline lock: 18 gauge in right antecubital area, using aseptic technique.      tp1 

      Blood collected.                                                                            

14:59 Triage completed.                                                                       tw2 

15:00 CT Stone Protocol In Process Unspecified.                                               EDMS

15:56 Shola Harris MD is Referral Physician.                                              [facility] 

16:26 No provider procedures requiring assistance completed. IV discontinued, intact,         jh6 

      bleeding controlled, No redness/swelling at site. Pressure dressing applied.                

                                                                                                  

Administered Medications:                                                                         

No medications were administered                                                                  

                                                                                                  

                                                                                                  

Outcome:                                                                                          

15:57 Discharge ordered by MD.                                                                [facility] 

16:26 Discharged to home ambulatory.                                                          [facility] 

16:26 Condition: stable                                                                           

16:26 Discharge instructions given to patient, Instructed on discharge instructions,              

      Demonstrated understanding of instructions, follow-up care.                                 

16:27 Patient left the ED.                                                                    [facility] 

                                                                                                  

Signatures:                                                                                       

Dispatcher [employer]                           EDMS                                                 

Julian Murcia PA PA   brandy                                                  

Ander Mckenna                                                                                    

Shirley Andujar, RN                          RN   tw2                                                  

Pat Echols RN                     RN   ld1                                                  

Elma Tee RN                   RN   6                                                  

Ara Stringer                              1                                                  

                                                                                                  

**************************************************************************************************

## 2021-11-24 NOTE — RAD REPORT
EXAM DESCRIPTION:  CT - Stone Protocol - 11/24/2021 3:00 pm

 

CLINICAL HISTORY:  Flank pain.

flank pain, hematuria

 

COMPARISON:  Abdomen   Pelvis W/Wo Contrast dated 10/7/2019

 

TECHNIQUE:  Axial images were obtained without oral or IV contrast. Lack of contrast limits solid org
an and vascular assessment. The field-of-view spans the entirety of the  system partially obscuring
 uppermost abdomen and lung bases. Coronal reformatted images were obtained and reviewed.

 

All CT scans are performed using dose optimization technique as appropriate and may include automated
 exposure control or mA/KV adjustment according to patient size.

 

FINDINGS:  The lower lung fields are clear.

 

Imaged portions of the liver and spleen show no suspicious findings on non-contrast imaging. The panc
reas and adrenal glands are normal. No pathologic lymphadenopathy in the abdomen or pelvis.

 

A 3 mm calculus is present in the midpole left kidney posteriorly. No additional calculus seen in eit
her kidney. No significant hydronephrosis. Prostate gland is quite prominent in size.

 

No bowel obstruction, free air, free fluid or abscess. Appendectomy.Aortoiliac atherosclerosis. Fat c
ontaining inguinal hernias bilaterally, greater on the right.

 

Mild lumbar degenerative changes are present.

 

IMPRESSION:  3 mm stone mid pole left kidney without hydronephrosis.

 

Significant prostatomegaly.

## 2022-03-04 ENCOUNTER — HOSPITAL ENCOUNTER (EMERGENCY)
Dept: HOSPITAL 97 - ER | Age: 82
Discharge: HOME | End: 2022-03-04
Payer: COMMERCIAL

## 2022-03-04 VITALS — DIASTOLIC BLOOD PRESSURE: 69 MMHG | TEMPERATURE: 97.6 F | OXYGEN SATURATION: 98 % | SYSTOLIC BLOOD PRESSURE: 127 MMHG

## 2022-03-04 DIAGNOSIS — Z88.5: ICD-10-CM

## 2022-03-04 DIAGNOSIS — Z91.048: ICD-10-CM

## 2022-03-04 DIAGNOSIS — R50.9: ICD-10-CM

## 2022-03-04 DIAGNOSIS — I10: ICD-10-CM

## 2022-03-04 DIAGNOSIS — Z20.822: ICD-10-CM

## 2022-03-04 DIAGNOSIS — F03.90: ICD-10-CM

## 2022-03-04 DIAGNOSIS — R10.819: ICD-10-CM

## 2022-03-04 DIAGNOSIS — K51.50: Primary | ICD-10-CM

## 2022-03-04 LAB
ALBUMIN SERPL BCP-MCNC: 3.3 G/DL (ref 3.4–5)
ALP SERPL-CCNC: 77 U/L (ref 45–117)
ALT SERPL W P-5'-P-CCNC: 23 U/L (ref 12–78)
AST SERPL W P-5'-P-CCNC: 17 U/L (ref 15–37)
BUN BLD-MCNC: 20 MG/DL (ref 7–18)
GLUCOSE SERPLBLD-MCNC: 108 MG/DL (ref 74–106)
HCT VFR BLD CALC: 41.5 % (ref 39.6–49)
INR BLD: 1.03
LIPASE SERPL-CCNC: 152 U/L (ref 73–393)
LYMPHOCYTES # SPEC AUTO: 0.6 K/UL (ref 0.7–4.9)
MAGNESIUM SERPL-MCNC: 2.2 MG/DL (ref 1.8–2.4)
NT-PROBNP SERPL-MCNC: 149 PG/ML (ref ?–450)
PMV BLD: 8.9 FL (ref 7.6–11.3)
POTASSIUM SERPL-SCNC: 3.7 MMOL/L (ref 3.5–5.1)
RBC # BLD: 4.35 M/UL (ref 4.33–5.43)
SARS-COV-2 RNA RESP QL NAA+PROBE: NEGATIVE
TROPONIN I SERPL HS-MCNC: 7.5 PG/ML (ref ?–58.9)

## 2022-03-04 PROCEDURE — 83690 ASSAY OF LIPASE: CPT

## 2022-03-04 PROCEDURE — 93005 ELECTROCARDIOGRAM TRACING: CPT

## 2022-03-04 PROCEDURE — 83735 ASSAY OF MAGNESIUM: CPT

## 2022-03-04 PROCEDURE — 84145 PROCALCITONIN (PCT): CPT

## 2022-03-04 PROCEDURE — 0241U: CPT

## 2022-03-04 PROCEDURE — 96375 TX/PRO/DX INJ NEW DRUG ADDON: CPT

## 2022-03-04 PROCEDURE — 80048 BASIC METABOLIC PNL TOTAL CA: CPT

## 2022-03-04 PROCEDURE — 80076 HEPATIC FUNCTION PANEL: CPT

## 2022-03-04 PROCEDURE — 87086 URINE CULTURE/COLONY COUNT: CPT

## 2022-03-04 PROCEDURE — 87088 URINE BACTERIA CULTURE: CPT

## 2022-03-04 PROCEDURE — 71045 X-RAY EXAM CHEST 1 VIEW: CPT

## 2022-03-04 PROCEDURE — 99284 EMERGENCY DEPT VISIT MOD MDM: CPT

## 2022-03-04 PROCEDURE — 36415 COLL VENOUS BLD VENIPUNCTURE: CPT

## 2022-03-04 PROCEDURE — 84484 ASSAY OF TROPONIN QUANT: CPT

## 2022-03-04 PROCEDURE — 83605 ASSAY OF LACTIC ACID: CPT

## 2022-03-04 PROCEDURE — 71250 CT THORAX DX C-: CPT

## 2022-03-04 PROCEDURE — 83880 ASSAY OF NATRIURETIC PEPTIDE: CPT

## 2022-03-04 PROCEDURE — 85610 PROTHROMBIN TIME: CPT

## 2022-03-04 PROCEDURE — 96374 THER/PROPH/DIAG INJ IV PUSH: CPT

## 2022-03-04 PROCEDURE — 85025 COMPLETE CBC W/AUTO DIFF WBC: CPT

## 2022-03-04 PROCEDURE — 87040 BLOOD CULTURE FOR BACTERIA: CPT

## 2022-03-04 PROCEDURE — 74176 CT ABD & PELVIS W/O CONTRAST: CPT

## 2022-03-04 NOTE — RAD REPORT
EXAM DESCRIPTION:  RAD - Chest Single View - 3/4/2022 11:26 am

 

CLINICAL HISTORY:  COUGH

 

COMPARISON:  Chest Single View dated 11/14/2020; Chest Single View dated 6/10/2016; CHEST SINGLE VIEW
 dated 1/10/2016; ABDOMEN 1 VIEW KUB dated 6/25/2015

 

FINDINGS:  Lines: None.

Lungs: No evidence of edema or pneumonia.

Pleural: No significant pleural effusions or pneumothorax.

Cardiac: The heart size is within normal limits.

Bones: No acute fractures.

Other:

 

IMPRESSION:  No acute cardiopulmonary disease.

## 2022-03-04 NOTE — ER
Nurse's Notes                                                                                     

 [facility]                                                                 

Name: Luan Hogan                                                                                 

Age: 82 yrs                                                                                       

Sex: Male                                                                                         

: 1940                                                                                   

MRN: [id number]                                                                                   

Arrival Date: 2022                                                                          

Time: 10:17                                                                                       

Account#: [id number]                                                                            

Bed 8                                                                                             

Private MD: ZEKE Tam C                                                                            

Diagnosis: Left sided colitis without complications;Fever, unspecified;Diarrhea,                  

  unspecified;Abdominal tenderness                                                                

                                                                                                  

Presentation:                                                                                     

                                                                                             

10:29 Chief complaint: Patient states: Diarrhea that started yesterday, generalized weakness, ww  

      fever of 100.3 last night. Coronavirus screen: Vaccine status: Patient reports              

      receiving the 2nd dose of the covid vaccine. Client denies travel out of the [place] in        

      the last 14 days. Ebola Screen: Patient denies travel to an Ebola-affected area in the      

      21 days before illness onset. Initial Sepsis Screen: Does the patient meet any 2            

      criteria? No. Patient's initial sepsis screen is negative. Does the patient have a          

      suspected source of infection? No. Patient's initial sepsis screen is negative. Risk        

      Assessment: Do you want to hurt yourself or someone else? Patient reports no desire to      

      harm self or others. Onset of symptoms was 2022.                                  

10:29 Method Of Arrival: Ambulatory                                                           ww  

10:29 Acuity: JUSTO 3                                                                           ww  

                                                                                                  

Triage Assessment:                                                                                

10:31 General: Appears in no apparent distress. Behavior is calm, cooperative. Pain: Denies   ww  

      pain. EENT: No signs and/or symptoms were reported regarding the EENT system. Neuro:        

      Level of Consciousness is awake, alert, obeys commands, Oriented to person, place,          

      time, situation, Speech is normal. Cardiovascular: Capillary refill < 3 seconds             

      Patient's skin is warm and dry. Respiratory: Airway is patent Respiratory effort is         

      even, unlabored, Respiratory pattern is regular, symmetrical. GI: Abdomen is                

      non-distended, Reports diarrhea. Derm: Skin is intact, Skin is pink, warm \T\ dry.          

                                                                                                  

Historical:                                                                                       

- Allergies:                                                                                      

10:30 tramadol;                                                                               ww  

10:30 Iodine;                                                                                 ww  

- PMHx:                                                                                           

10:30 Myocardial infarction; Hypertensive disorder; Dementia;                                 ww  

                                                                                                  

- Immunization history:: Adult Immunizations up to date.                                          

- Social history:: Smoking status: Patient denies any tobacco usage or history of.                

                                                                                                  

                                                                                                  

Screenin:29 Abuse screen: Denies threats or abuse. Nutritional screening: No deficits noted.        jh6 

      Tuberculosis screening: No symptoms or risk factors identified. Fall Risk None              

      identified.                                                                                 

                                                                                                  

Assessment:                                                                                       

11:00 General: Appears in no apparent distress. comfortable, Behavior is calm, cooperative.   jh6 

      Pain: Complains of pain in abdomen Pain does not radiate. Pain currently is 3 out of 10     

      on a pain scale.                                                                            

11:00 GI: Reports lower abdominal pain, upper abdominal pain, diarrhea, gaseousness.          jh6 

12:00 Reassessment: Patient and/or family updated on plan of care and expected duration. Pain jh6 

      level reassessed. Patient is alert, oriented x 3, equal unlabored respirations, skin        

      warm/dry/pink. Patient denies pain at this time.                                            

13:00 Reassessment: No changes from previously documented assessment. Patient and/or family   jh6 

      updated on plan of care and expected duration. Pain level reassessed. pt resting with       

      spouse at bedside. NAD noted and has had no episodes of loose stool.                        

                                                                                                  

Vital Signs:                                                                                      

10:29  / 72; Pulse 62; Resp 18; Temp 97.9; Pulse Ox 96% on R/A; Weight 93.89 kg; Height ww  

      6 ft. 0 in. (182.88 cm); Pain 0/10;                                                         

11:28  / 70; Pulse 62; Resp 18; Temp 98.4; Pulse Ox 96% ; Pain 3/10;                    jh6 

12:30  / 77; Pulse 58; Resp 20; Pulse Ox 96% ; Pain 2/10;                               jh6 

13:30  / 72; Pulse 60; Resp 18; Pulse Ox 100% ; Pain 2/10;                              jh6 

14:30  / 69; Pulse 62; Resp 18; Temp 97.6(O); Pulse Ox 98% ; Pain 0/10;                 jh6 

10:29 Body Mass Index 28.07 (93.89 kg, 182.88 cm)                                             ww  

                                                                                                  

ED Course:                                                                                        

10:17 Patient arrived in ED.                                                                  as  

10:18 ZEKE Tam MD is Private Physician.                                                       as  

10:30 Triage completed.                                                                       ww  

10:31 Arm band placed on right wrist.                                                         ww  

10:33 Mitesh Ferguson MD is Attending Physician.                                             [facility] 

11:01 CT Chest Abdomen Pelvis W/O Contrast In Process Unspecified.                            EDMS

11:25 XRAY Chest (1 view) In Process Unspecified.                                             [facility]

11:27 Elma Tee, RN is Primary Nurse.                                                 [facility] 

11:29 Call light in reach. Side rails up X2. Adult w/ patient.                                [facility] 

11:29 Inserted saline lock: 22 gauge in right antecubital area, using aseptic technique.      [facility] 

12:15 Basic Metabolic Panel Sent.                                                               

13:00 ZEKE Tam MD is Referral Physician.                                                      [facility] 

14:45 No provider procedures requiring assistance completed. IV discontinued, intact,         [facility] 

      bleeding controlled, No redness/swelling at site. Pressure dressing applied.                

                                                                                                  

Administered Medications:                                                                         

11:33 Drug: Rocephin (cefTRIAXone) 1 grams Route: IV; Rate: per protocol; Site: right         6 

      antecubital;                                                                                

11:39 Drug: NS 0.9% 1000 ml Route: IV; Rate: 125 ml/hr; Site: right antecubital;              [facility] 

13:10 Drug: Flagyl (metroNIDAZOLE) 500 mg Volume: 100 ml; Route: IVPB; Rate: 200 ml/hr;       ww  

      Infused Over: 30 mins; Site: right antecubital;                                             

13:10 Drug: Cipro (ciprofloxacin) 500 mg Route: PO;                                           ww  

                                                                                                  

                                                                                                  

Outcome:                                                                                          

13:01 Discharge ordered by MD.                                                                [facility] 

14:45 Discharged to home ambulatory.                                                          [facility] 

14:45 Condition: good                                                                             

14:45 Discharge instructions given to patient, family, Instructed on discharge instructions,      

      follow up and referral plans. Demonstrated understanding of instructions, follow-up         

      care, medications, Prescriptions given X 3.                                                 

14:45 Patient left the ED.                                                                    [facility] 

                                                                                                  

Signatures:                                                                                       

Dispatcher [employer]                           Mitesh Daniels MD MD cha Martinez, Amelia                             as                                                   

Elma Tee, RN                   KARLEY   [facility]                                                  

Antonia Lerma RN                       RN                                                      

                                                                                                  

**************************************************************************************************

## 2022-03-04 NOTE — EDPHYS
Physician Documentation                                                                           

 [facility]                                                                 

Name: Luan Hogan                                                                                 

Age: 82 yrs                                                                                       

Sex: Male                                                                                         

: 1940                                                                                   

MRN: [id number]                                                                                   

Arrival Date: 2022                                                                          

Time: 10:17                                                                                       

Account#: [id number]                                                                            

Bed 8                                                                                             

Private MD: ZEKE Tam C                                                                            

ED Physician Mitesh Ferguson                                                                      

HPI:                                                                                              

                                                                                             

12:55 This 82 yrs old  Male presents to ER via Ambulatory with complaints of         hector 

      Diarrhea, Fever.                                                                            

12:55 The patient presents to the emergency department with diarrhea, 5 times since the onset hector 

      of symptoms. Onset: The symptoms/episode began/occurred 1 day(s) ago. Possible causes:      

      unknown. The symptoms are aggravated by nothing. The symptoms are alleviated by             

      nothing. Associated signs and symptoms: The patient has no apparent associated signs or     

      symptoms. Severity of symptoms: At their worst the symptoms were mild in the emergency      

      department the symptoms have resolved. The patient has not experienced similar symptoms     

      in the past.                                                                                

                                                                                                  

Historical:                                                                                       

- Allergies:                                                                                      

10:30 tramadol;                                                                               ww  

10:30 Iodine;                                                                                 ww  

- PMHx:                                                                                           

10:30 Myocardial infarction; Hypertensive disorder; Dementia;                                 ww  

                                                                                                  

- Immunization history:: Adult Immunizations up to date.                                          

- Social history:: Smoking status: Patient denies any tobacco usage or history of.                

                                                                                                  

                                                                                                  

ROS:                                                                                              

12:56 Constitutional: Negative for fever, chills, and weight loss, Eyes: Negative for injury, hector 

      pain, redness, and discharge, ENT: Negative for injury, pain, and discharge, Neck:          

      Negative for injury, pain, and swelling, Cardiovascular: Negative for chest pain,           

      palpitations, and edema, Respiratory: Negative for shortness of breath, cough,              

      wheezing, and pleuritic chest pain, Back: Negative for injury and pain, : Negative        

      for injury, bleeding, discharge, and swelling, MS/Extremity: Negative for injury and        

      deformity, Skin: Negative for injury, rash, and discoloration, Neuro: Negative for          

      headache, weakness, numbness, tingling, and seizure, Psych: Negative for depression,        

      anxiety, suicide ideation, homicidal ideation, and hallucinations, Allergy/Immunology:      

      Negative for hives, rash, and allergies, Endocrine: Negative for neck swelling,             

      polydipsia, polyuria, polyphagia, and marked weight changes, Hematologic/Lymphatic:         

      Negative for swollen nodes, abnormal bleeding, and unusual bruising.                        

12:56 Respiratory:                                                                                

12:56 Abdomen/GI: Positive for diarrhea.                                                          

                                                                                                  

Exam:                                                                                             

12:56 Constitutional:  This is a well developed, well nourished patient who is awake, alert,  hector 

      and in no acute distress. Head/Face:  Normocephalic, atraumatic. Eyes:  Pupils equal        

      round and reactive to light, extra-ocular motions intact.  Lids and lashes normal.          

      Conjunctiva and sclera are non-icteric and not injected.  Cornea within normal limits.      

      Periorbital areas with no swelling, redness, or edema. ENT:  Nares patent. No nasal         

      discharge, no septal abnormalities noted.  Tympanic membranes are normal and external       

      auditory canals are clear.  Oropharynx with no redness, swelling, or masses, exudates,      

      or evidence of obstruction, uvula midline.  Mucous membranes moist. Neck:  Trachea          

      midline, no thyromegaly or masses palpated, and no cervical lymphadenopathy.  Supple,       

      full range of motion without nuchal rigidity, or vertebral point tenderness.  No            

      Meningismus. Chest/axilla:  Normal chest wall appearance and motion.  Nontender with no     

      deformity.  No lesions are appreciated. Cardiovascular:  Regular rate and rhythm with a     

      normal S1 and S2.  No gallops, murmurs, or rubs.  Normal PMI, no JVD.  No pulse             

      deficits. Respiratory:  Lungs have equal breath sounds bilaterally, clear to                

      auscultation and percussion.  No rales, rhonchi or wheezes noted.  No increased work of     

      breathing, no retractions or nasal flaring. Abdomen/GI:  Soft, non-tender, with normal      

      bowel sounds.  No distension or tympany.  No guarding or rebound.  No evidence of           

      tenderness throughout. Back:  No spinal tenderness.  No costovertebral tenderness.          

      Full range of motion. Male :  Normal genitalia with no discharge or lesions. Skin:        

      Warm, dry with normal turgor.  Normal color with no rashes, no lesions, and no evidence     

      of cellulitis. MS/ Extremity:  Pulses equal, no cyanosis.  Neurovascular intact.  Full,     

      normal range of motion. Neuro:  Awake and alert, GCS 15, oriented to person, place,         

      time, and situation.  Cranial nerves II-XII grossly intact.  Motor strength 5/5 in all      

      extremities.  Sensory grossly intact.  Cerebellar exam normal.  Normal gait. Psych:         

      Awake, alert, with orientation to person, place and time.  Behavior, mood, and affect       

      are within normal limits.                                                                   

13:06 ECG was reviewed by the Attending Physician.                                            [facility] 

                                                                                                  

Vital Signs:                                                                                      

10:29  / 72; Pulse 62; Resp 18; Temp 97.9; Pulse Ox 96% on R/A; Weight 93.89 kg; Height ww  

      6 ft. 0 in. (182.88 cm); Pain 0/10;                                                         

11:28  / 70; Pulse 62; Resp 18; Temp 98.4; Pulse Ox 96% ; Pain 3/10;                    jh6 

12:30  / 77; Pulse 58; Resp 20; Pulse Ox 96% ; Pain 2/10;                               jh6 

13:30  / 72; Pulse 60; Resp 18; Pulse Ox 100% ; Pain 2/10;                              jh6 

14:30  / 69; Pulse 62; Resp 18; Temp 97.6(O); Pulse Ox 98% ; Pain 0/10;                 jh6 

10:29 Body Mass Index 28.07 (93.89 kg, 182.88 cm)                                               

                                                                                                  

MDM:                                                                                              

10:33 Patient medically screened.                                                             [facility] 

12:57 Differential diagnosis: Nonspecific abd pain, gastritis, cholecystitis, pancreatitis,   hector 

      appendicitis, diverticulitis, viral gastroenteritis, gastroenteritis, viral Infection,      

      bacterial infection, gastroenteritis. Data reviewed: vital signs, nurses notes, lab         

      test result(s), EKG, radiologic studies, CT scan, plain films. Data interpreted:            

      Cardiac monitor: rate is 62 beats/min, rhythm is regular, Pulse oximetry: is not            

      applicable for this patient encounter. on. Test interpretation: by ED physician or          

      midlevel provider: ECG, plain radiologic studies. Counseling: I had a detailed              

      discussion with the patient and/or guardian regarding: the historical points, exam          

      findings, and any diagnostic results supporting the discharge/admit diagnosis, lab          

      results, radiology results.                                                                 

                                                                                                  

                                                                                             

10:37 Order name: Basic Metabolic Panel                                                       [facility] 

                                                                                             

10:37 Order name: CBC with Diff; Complete Time: 12:58                                         [facility] 

                                                                                             

10:37 Order name: LFT's; Complete Time: 12:58                                                 [facility] 

                                                                                             

10:37 Order name: Magnesium; Complete Time: 12:58                                             [facility] 

                                                                                             

10:37 Order name: NT PRO-BNP; Complete Time: 12:58                                            [facility] 

                                                                                             

10:37 Order name: PT-INR; Complete Time: 12:58                                                [facility] 

                                                                                             

10:37 Order name: Troponin HS; Complete Time: 12:58                                                                                                                                        

10:37 Order name: Lipase; Complete Time: 12:58                                                                                                                                             

10:37 Order name: Urine Culture                                                                                                                                                            

10:37 Order name: Blood Culture Adult (2)                                                                                                                                                  

10:37 Order name: Lactate; Complete Time: 12:58                                                                                                                                            

10:37 Order name: Procalcitonin; Complete Time: 12:58                                                                                                                                      

10:37 Order name: COVID-19/FLU A+B/RSV (Document "Date of Onset" if Symptomatic)                                                                                                           

10:37 Order name: Fecal Leukocyte Stain                                                                                                                                                    

10:37 Order name: XRAY Chest (1 view); Complete Time: 12:58                                                                                                                                

10:37 Order name: EKG; Complete Time: 10:38                                                                                                                                                

10:37 Order name: Cardiac monitoring; Complete Time: 12:05                                                                                                                                 

10:37 Order name: EKG - Nurse/Tech; Complete Time: 12:05                                                                                                                                   

10:37 Order name: IV Saline Lock; Complete Time: 12:05                                                                                                                                     

10:37 Order name: Labs collected and sent; Complete Time: 12:05                                                                                                                            

10:37 Order name: O2 Per Protocol; Complete Time: 12:05                                                                                                                                    

10:37 Order name: O2 Sat Monitoring; Complete Time: 12:05                                                                                                                                  

10:37 Order name: CT Chest Abdomen Pelvis W/O Contrast; Complete Time: 12:58                                                                                                               

10:37 Order name: Occult Blood                                                                                                                                                             

10:37 Order name: Stool Culture                                                                                                                                                            

10:37 Order name: Basic Metabolic Panel; Complete Time: 12:58                                 [facility]

                                                                                                  

EC:06 Rate is 60 beats/min. Rhythm is regular. QRS Axis is Normal. TX interval is normal. QRS hector 

      interval is normal. QT interval is normal. No Q waves. T waves are Normal. No ST            

      changes noted. Clinical impression: Normal ECG and No evidence of ischemia. Interpreted     

      by me. Reviewed by me.                                                                      

                                                                                                  

Administered Medications:                                                                         

11:33 Drug: Rocephin (cefTRIAXone) 1 grams Route: IV; Rate: per protocol; Site: right         jh6 

      antecubital;                                                                                

11:39 Drug: NS 0.9% 1000 ml Route: IV; Rate: 125 ml/hr; Site: right antecubital;              jh6 

13:10 Drug: Flagyl (metroNIDAZOLE) 500 mg Volume: 100 ml; Route: IVPB; Rate: 200 ml/hr;       ww  

      Infused Over: 30 mins; Site: right antecubital;                                             

13:10 Drug: Cipro (ciprofloxacin) 500 mg Route: PO;                                           ww  

                                                                                                  

                                                                                                  

Disposition Summary:                                                                              

22 13:01                                                                                    

Discharge Ordered                                                                                 

      Location: Home                                                                          [facility] 

      Problem: new                                                                            hector 

      Symptoms: have improved                                                                 hector 

      Condition: Stable                                                                       [facility] 

      Diagnosis                                                                                   

        - Left sided colitis without complications                                            hector 

        - Fever, unspecified                                                                  hector 

        - Diarrhea, unspecified                                                               hector 

        - Abdominal tenderness                                                                hector 

      Followup:                                                                               [facility] 

        - With: ZEKE Tam MD                                                                        

        - When: 2 - 3 days                                                                         

        - Reason: Recheck today's complaints, Continuance of care, Re-evaluation by your           

      physician                                                                                   

      Discharge Instructions:                                                                     

        - Discharge Summary Sheet                                                             ehctor 

        - Abdominal Pain, Adult                                                               hector 

        - Diarrhea, Adult                                                                     hector 

        - Fever, Adult                                                                        hector 

        - Abdominal Pain, Adult, Easy-to-Read                                                 hector 

        - Diarrhea, Adult, Easy-to-Read                                                       hector 

        - Fever, Adult, Easy-to-Read                                                          hector 

        - Colitis                                                                             [facility] 

      Forms:                                                                                      

        - Medication Reconciliation Form                                                      [facility] 

        - Thank You Letter                                                                    [facility] 

        - Antibiotic Education                                                                [facility] 

        - Prescription Opioid Use                                                             [facility] 

      Prescriptions:                                                                              

        - Flagyl 500 mg Oral Tablet                                                                

            - take 1 tablet by ORAL route 3 times per day for 7 days; 21 tablet; Refills: 0,  [facility] 

      Product Selection Permitted                                                                 

        - Cipro 500 mg Oral Tablet                                                                 

            - take 1 tablet by ORAL route every 12 hours for 7 days; 14 tablet; Refills: 0,   [facility] 

      Product Selection Permitted                                                                 

        - dicyclomine 20 mg Oral Tablet                                                            

            - take 1 tablet by ORAL route 4 times per day; 28 tablet; Refills: 0, Product     [facility] 

      Selection Permitted                                                                         

Signatures:                                                                                       

Dispatcher [employer]                           Mitesh Daniels MD MD cha Hastedt, Jennifer, RN                   RN   jh6                                                  

Antonia Lerma RN                       RN   ww                                                   

                                                                                                  

**************************************************************************************************

## 2022-03-04 NOTE — RAD REPORT
EXAM DESCRIPTION:  CTChest Abd Pelvis Wo Con - 3/4/2022 11:01 am

 

CLINICAL HISTORY:

Abdominal distention;Cough

 

COMPARISON:  No comparisons

 

TECHNIQUE:  CT of the chest, abdomen, and pelvis was performed.

 

All CT scans are performed using dose optimization technique as appropriate and may include automated
 exposure control or mA/KV adjustment according to patient size.

 

FINDINGS:  Thorax:

Chest Wall: No abnormal mass

Lungs: No acute abnormality.

Pleura: No effusions or pneumothorax.

Yahaira/Mediastinum: No lymphadenopathy.

Aorta/Pulmonary Arteries: Unremarkable

Heart: Normal size. Multi-vessel coronary artery disease.

 

Abdomen/Pelvis:

Liver: No acute abnormality or suspicious lesions.

Biliary: No biliary ductal dilatation.

Stomach: No significant focal abnormality.

Duodenum: No significant focal abnormality.

Pancreas: No significant abnormality.

Spleen: No significant abnormality.

Adrenal: No suspicious lesions.

Kidney/ureter: No hydronephrosis. No renal calculi.

Retroperitoneum: No retroperitoneal adenopathy.

Vascular: No aneurysm.

Bowel: Mild sigmoid and rectal wall thickening. No bowel obstruction. Normal appendix..

Peritoneum: No ascites or free air.

Bladder: Grossly unremarkable.

Reproductive: Moderate prostatomegaly.

 

Bones: No acute fracture.

Other: n/a

 

IMPRESSION:  1. Mild sigmoid and rectal wall thickening could indicate a mild colitis. No bowel obstr
uction. Normal appendix.

2. No acute findings within the chest.